# Patient Record
Sex: MALE | Race: WHITE | NOT HISPANIC OR LATINO | Employment: STUDENT | URBAN - METROPOLITAN AREA
[De-identification: names, ages, dates, MRNs, and addresses within clinical notes are randomized per-mention and may not be internally consistent; named-entity substitution may affect disease eponyms.]

---
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
## 2023-10-06 ENCOUNTER — HOSPITAL ENCOUNTER (EMERGENCY)
Facility: HOSPITAL | Age: 18
Discharge: HOME | End: 2023-10-06
Attending: STUDENT IN AN ORGANIZED HEALTH CARE EDUCATION/TRAINING PROGRAM
Payer: MEDICAID

## 2023-10-06 VITALS
BODY MASS INDEX: 21.26 KG/M2 | DIASTOLIC BLOOD PRESSURE: 68 MMHG | OXYGEN SATURATION: 98 % | SYSTOLIC BLOOD PRESSURE: 116 MMHG | HEART RATE: 78 BPM | TEMPERATURE: 97 F | RESPIRATION RATE: 16 BRPM | WEIGHT: 120 LBS | HEIGHT: 63 IN

## 2023-10-06 DIAGNOSIS — R10.13 EPIGASTRIC PAIN: Primary | ICD-10-CM

## 2023-10-06 LAB
ALBUMIN SERPL-MCNC: 5.3 G/DL (ref 3.3–5.5)
ALBUMIN SERPL-MCNC: 5.5 G/DL (ref 3.3–5.5)
ALP SERPL-CCNC: 69 U/L (ref 42–141)
ALP SERPL-CCNC: 70 U/L (ref 42–141)
BILIRUB SERPL-MCNC: 1.2 MG/DL (ref 0.2–1.6)
BILIRUB SERPL-MCNC: 1.2 MG/DL (ref 0.2–1.6)
BILIRUBIN, POC UA: NEGATIVE
BLOOD, POC UA: NEGATIVE
BUN SERPL-MCNC: 10 MG/DL (ref 7–22)
CALCIUM SERPL-MCNC: 10.5 MG/DL (ref 8–10.3)
CHLORIDE SERPL-SCNC: 104 MMOL/L (ref 98–108)
CLARITY, POC UA: CLEAR
COLOR, POC UA: YELLOW
CREAT SERPL-MCNC: 0.9 MG/DL (ref 0.6–1.2)
GLUCOSE SERPL-MCNC: 109 MG/DL (ref 73–118)
GLUCOSE, POC UA: NEGATIVE
KETONES, POC UA: ABNORMAL
LEUKOCYTE EST, POC UA: NEGATIVE
NITRITE, POC UA: NEGATIVE
PH UR STRIP: 7 [PH]
POC ALT (SGPT): 16 U/L (ref 10–47)
POC ALT (SGPT): 19 U/L (ref 10–47)
POC AMYLASE: 33 U/L (ref 14–97)
POC AST (SGOT): 24 U/L (ref 11–38)
POC AST (SGOT): 28 U/L (ref 11–38)
POC GGT: 12 U/L (ref 5–65)
POC TCO2: 26 MMOL/L (ref 18–33)
POTASSIUM BLD-SCNC: 3.8 MMOL/L (ref 3.6–5.1)
PROTEIN, POC UA: ABNORMAL
PROTEIN, POC: 7.6 G/DL (ref 6.4–8.1)
PROTEIN, POC: 7.7 G/DL (ref 6.4–8.1)
SODIUM BLD-SCNC: 140 MMOL/L (ref 128–145)
SPECIFIC GRAVITY, POC UA: >=1.03
UROBILINOGEN, POC UA: 0.2 E.U./DL

## 2023-10-06 PROCEDURE — 96374 THER/PROPH/DIAG INJ IV PUSH: CPT | Mod: ER

## 2023-10-06 PROCEDURE — 82040 ASSAY OF SERUM ALBUMIN: CPT | Mod: 59,ER

## 2023-10-06 PROCEDURE — 80053 COMPREHEN METABOLIC PANEL: CPT | Mod: ER

## 2023-10-06 PROCEDURE — 63600175 PHARM REV CODE 636 W HCPCS: Mod: ER

## 2023-10-06 PROCEDURE — 25000003 PHARM REV CODE 250: Mod: ER

## 2023-10-06 PROCEDURE — 99284 EMERGENCY DEPT VISIT MOD MDM: CPT | Mod: 25,ER

## 2023-10-06 PROCEDURE — 96375 TX/PRO/DX INJ NEW DRUG ADDON: CPT | Mod: ER

## 2023-10-06 PROCEDURE — 82150 ASSAY OF AMYLASE: CPT | Mod: ER

## 2023-10-06 PROCEDURE — 96361 HYDRATE IV INFUSION ADD-ON: CPT | Mod: ER

## 2023-10-06 RX ORDER — ONDANSETRON 4 MG/1
4 TABLET, ORALLY DISINTEGRATING ORAL EVERY 6 HOURS PRN
Qty: 14 TABLET | Refills: 0 | Status: SHIPPED | OUTPATIENT
Start: 2023-10-06

## 2023-10-06 RX ORDER — FAMOTIDINE 20 MG/1
20 TABLET, FILM COATED ORAL 2 TIMES DAILY
Qty: 20 TABLET | Refills: 0 | Status: SHIPPED | OUTPATIENT
Start: 2023-10-06 | End: 2023-10-16

## 2023-10-06 RX ORDER — ONDANSETRON 2 MG/ML
4 INJECTION INTRAMUSCULAR; INTRAVENOUS
Status: COMPLETED | OUTPATIENT
Start: 2023-10-06 | End: 2023-10-06

## 2023-10-06 RX ORDER — FAMOTIDINE 10 MG/ML
20 INJECTION INTRAVENOUS
Status: COMPLETED | OUTPATIENT
Start: 2023-10-06 | End: 2023-10-06

## 2023-10-06 RX ORDER — SUCRALFATE 1 G/1
1 TABLET ORAL 4 TIMES DAILY
Qty: 40 TABLET | Refills: 0 | Status: SHIPPED | OUTPATIENT
Start: 2023-10-06 | End: 2023-10-16

## 2023-10-06 RX ORDER — MAG HYDROX/ALUMINUM HYD/SIMETH 200-200-20
30 SUSPENSION, ORAL (FINAL DOSE FORM) ORAL
Status: COMPLETED | OUTPATIENT
Start: 2023-10-06 | End: 2023-10-06

## 2023-10-06 RX ADMIN — ALUMINUM HYDROXIDE, MAGNESIUM HYDROXIDE, AND DIMETHICONE 30 ML: 200; 20; 200 SUSPENSION ORAL at 04:10

## 2023-10-06 RX ADMIN — ONDANSETRON 4 MG: 2 INJECTION INTRAMUSCULAR; INTRAVENOUS at 04:10

## 2023-10-06 RX ADMIN — SODIUM CHLORIDE, POTASSIUM CHLORIDE, SODIUM LACTATE AND CALCIUM CHLORIDE 1000 ML: 600; 310; 30; 20 INJECTION, SOLUTION INTRAVENOUS at 04:10

## 2023-10-06 RX ADMIN — FAMOTIDINE 20 MG: 10 INJECTION INTRAVENOUS at 04:10

## 2023-10-06 NOTE — ED PROVIDER NOTES
Encounter Date: 10/6/2023    SCRIBE #1 NOTE: IDIMPLE am scribing for, and in the presence of,  Siddharth Moffett PA-C. I have scribed the following portions of the note - Other sections scribed: HPI, ROS, PE.       History     Chief Complaint   Patient presents with    Abdominal Pain     Left sided abd pain, onset this am, thinks he ate bad fish last night     Jakob Arboleda is a 18 y.o. male, with a PMHx of migraines and recurring constipation, who presents to the ED with epigastric pain x 1 day. Patient reports he may have eaten some spoiled salmon last night.  Reports nausea, but denies no episodes of emesis.  Reports 1 episode of BRPR but denies any other occurrences.  Denies headache, lightheadedness.  He denies any history/prior episodes of hematochezia. He denies any recent trauma to the abdomen. Pt endorses a history of frequent NSAID use for treating migraines.  Reports last bowel movement today, passing flatus.  Denies history of abdominal surgeries.  No other exacerbating or alleviating factors. Denies fever, chills, cough, congestion, rhinorrhea, dysuria, testicular pain, or other associated symptoms.    The history is provided by the patient. No  was used.     Review of patient's allergies indicates:   Allergen Reactions    Penicillins Swelling     No past medical history on file.  No past surgical history on file.  No family history on file.  Social History     Tobacco Use    Smoking status: Never     Review of Systems   Constitutional:  Negative for chills and fever.   HENT:  Negative for congestion, ear pain, rhinorrhea and sore throat.    Respiratory:  Negative for cough and shortness of breath.    Cardiovascular:  Negative for chest pain.   Gastrointestinal:  Positive for abdominal pain, blood in stool (blood when wiping) and nausea. Negative for diarrhea and vomiting.   Genitourinary:  Negative for dysuria, flank pain, frequency, hematuria and testicular pain.    Neurological:  Negative for light-headedness and headaches.       Physical Exam     Initial Vitals [10/06/23 1625]   BP Pulse Resp Temp SpO2   129/70 88 19 97.9 °F (36.6 °C) 99 %      MAP       --         Physical Exam    Nursing note and vitals reviewed.  Constitutional: Vital signs are normal. He appears well-developed. He is cooperative.  Non-toxic appearance. He does not appear ill.   HENT:   Head: Normocephalic and atraumatic.   Eyes: Conjunctivae are normal.   Neck:   Normal range of motion.  Cardiovascular:  Normal rate and regular rhythm.           Pulmonary/Chest: Effort normal and breath sounds normal. He exhibits no tenderness.   Abdominal: Abdomen is soft. There is no abdominal tenderness.   Musculoskeletal:      Cervical back: Normal range of motion.     Neurological: He is alert and oriented to person, place, and time. GCS eye subscore is 4. GCS verbal subscore is 5. GCS motor subscore is 6.   Skin: Skin is warm, dry and intact. No rash noted.   Psychiatric: He has a normal mood and affect. His speech is normal and behavior is normal. Judgment and thought content normal.         ED Course   Procedures  Labs Reviewed   POCT URINALYSIS W/O SCOPE - Abnormal; Notable for the following components:       Result Value    Ketones, UA Trace (*)     Spec Grav UA >=1.030 (*)     Protein, UA Trace (*)     All other components within normal limits   POCT CMP - Abnormal; Notable for the following components:    Calcium, POC 10.5 (*)     All other components within normal limits   POCT CBC   POCT URINALYSIS W/O SCOPE   POCT CMP   POCT LIVER PANEL   POCT LIVER PANEL          Imaging Results    None          Medications   lactated ringers bolus 1,000 mL (0 mLs Intravenous Stopped 10/6/23 1732)   aluminum-magnesium hydroxide-simethicone 200-200-20 mg/5 mL suspension 30 mL (30 mLs Oral Given 10/6/23 1657)   famotidine (PF) injection 20 mg (20 mg Intravenous Given 10/6/23 1657)   ondansetron injection 4 mg (4 mg  Intravenous Given 10/6/23 6864)     Medical Decision Making  This is an emergent evaluation of a 18-year-old male with a past medical history of migraines and constipation who presents to the emergency department for evaluation of epigastric pain x 1 day.  Physical exam reveals Regular rate rhythm without murmurs.  Lungs are clear to auscultation bilaterally.  Abdomen is soft, nontender, non distended, with normal bowel sounds.  Differential diagnosis includes but is not limited to GERD/gastritis, pancreatitis, cholecystitis, appendicitis, bowel obstruction.  Workup initiated with basic labs, amylase, UA.  Ordered GI cocktail, Pepcid, Zofran for symptoms.  I am not concerned for an acute abdomen at this time given benign abdominal exam.  CBC without leukocytosis or anemia.  CMP with normal renal function, no electrolyte abnormalities.  Amylase within normal limits.  UA showing no signs of infection, negative for nitrites or leukocytes.  Patient reports improvement in symptoms after GI cocktail, Pepcid, Zofran.  Serial abdominal exam remains benign.  Will treat for GERD/gastritis at this time.  Will send home with Pepcid, Carafate, Zofran.  Ambulatory referral placed to gastroenterology for follow-up. Patient is very well appearing, and in no acute distress. Vital signs are reassuring here in the emergency department, patient is afebrile, breathing comfortable, satting 98 % on room air. Patient is stable for discharge at this time. Patient verbalizes understanding of care plan. All questions and concerns were addressed. Discussed strict return precautions with the patient. Instructed follow up with primary care provider and GI within 1 week.      Siddharth Moffett PA-C    DISCLAIMER: This note was prepared with [employer] voice recognition transcription software. Garbled syntax, mangled pronouns, and other bizarre constructions may be attributed to that software system.     Amount and/or Complexity of Data Reviewed  Labs:  ordered.    Risk  OTC drugs.  Prescription drug management.            Scribe Attestation:   Scribe #1: I performed the above scribed service and the documentation accurately describes the services I performed. I attest to the accuracy of the note.                      I, Siddharth Moffett PA-C, personally performed the services described in this documentation.  All medical record entries made by the scribe were at my direction and in my presence.  I have reviewed the chart and agree that the record reflects my personal performance and is accurate and complete.  Clinical Impression:   Final diagnoses:  [R10.13] Epigastric pain (Primary)        ED Disposition Condition    Discharge Stable          ED Prescriptions       Medication Sig Dispense Start Date End Date Auth. Provider    sucralfate (CARAFATE) 1 gram tablet Take 1 tablet (1 g total) by mouth 4 (four) times daily. for 10 days 40 tablet 10/6/2023 10/16/2023 Siddharth Moffett PA-C    famotidine (PEPCID) 20 MG tablet Take 1 tablet (20 mg total) by mouth 2 (two) times daily. for 10 days 20 tablet 10/6/2023 10/16/2023 Siddharth Moffett PA-C    ondansetron (ZOFRAN-ODT) 4 MG TbDL Take 1 tablet (4 mg total) by mouth every 6 (six) hours as needed (For nausea and vomiting). 14 tablet 10/6/2023 -- Siddharth Moffett PA-C          Follow-up Information       Follow up With Specialties Details Why Contact Info    [facility] ED Emergency Medicine Go to  As needed, If symptoms worsen, or new symptoms develop [address] [facility] [address] [phone]             Siddharth Moffett PA-C  10/06/23 3393

## 2023-10-06 NOTE — Clinical Note
"Jakob"Leigh Arboleda was seen and treated in our emergency department on 10/6/2023.  He may return to work on 10/06/2023.       If you have any questions or concerns, please don't hesitate to call.      Siddharth Moffett PA-C"

## 2023-10-06 NOTE — DISCHARGE INSTRUCTIONS
You may take Carafate and Pepcid as prescribed for abdominal pain.  You may take Zofran as prescribed for nausea and vomiting.  I have placed a referral to Gastroenterology, expected call for arrangements.  Please also follow-up with your primary care provider within 1 week.  Thank you for coming to our Emergency Department today. It is important to remember that some problems are difficult to diagnose and may not be found during your Emergency Department visit. Be sure to follow up with your primary care doctor and review all labs/imaging/tests that were performed during this visit with them. Some labs/tests may be outside of the normal range and require non-emergent follow-up and further investigation to help diagnose/exclude/prevent complications or other medical conditions.    If you do not have a primary care doctor, you may contact the one listed on your discharge paperwork or you may also call the [facility] Appointment Desk at [phone] to schedule an appointment and establish care with one. It is important to your health that you have a primary care doctor.    Please take all medications as directed. All medications may potentially have side-effects and it is impossible to predict which medications may give you side-effects or what side-effects (if any) they will give you.. If you feel that you are having a negative effect or side-effect of any medication you should immediately stop taking them and seek medical attention. If you feel that you are having a life-threatening reaction call 361.    Return to the ER with any questions/concerns, new/concerning symptoms, worsening or failure to improve.     Do not drive, swim, climb to height, take a bath or make any important decisions for 24 hours if you have received any pain medications, sedatives or mood altering drugs during your ER visit.

## 2025-03-16 ENCOUNTER — HOSPITAL ENCOUNTER (EMERGENCY)
Facility: HOSPITAL | Age: 20
Discharge: HOME | End: 2025-03-16
Attending: INTERNAL MEDICINE

## 2025-03-16 VITALS
TEMPERATURE: 99 F | BODY MASS INDEX: 23.32 KG/M2 | RESPIRATION RATE: 16 BRPM | HEIGHT: 65 IN | OXYGEN SATURATION: 99 % | WEIGHT: 140 LBS | HEART RATE: 76 BPM | DIASTOLIC BLOOD PRESSURE: 69 MMHG | SYSTOLIC BLOOD PRESSURE: 123 MMHG

## 2025-03-16 DIAGNOSIS — R10.9 FLANK PAIN: ICD-10-CM

## 2025-03-16 DIAGNOSIS — N20.0 KIDNEY STONE: Primary | ICD-10-CM

## 2025-03-16 LAB
ALBUMIN SERPL-MCNC: 4.6 G/DL (ref 3.3–5.5)
ALLENS TEST: ABNORMAL
ALP SERPL-CCNC: 62 U/L (ref 42–141)
BILIRUB SERPL-MCNC: 0.8 MG/DL (ref 0.2–1.6)
BILIRUBIN, POC UA: NEGATIVE
BLOOD, POC UA: ABNORMAL
BUN SERPL-MCNC: 9 MG/DL (ref 7–22)
CALCIUM SERPL-MCNC: 10.4 MG/DL (ref 8–10.3)
CHLORIDE SERPL-SCNC: 107 MMOL/L (ref 98–108)
CLARITY, UA: CLEAR
COLOR, UA: YELLOW
CREAT SERPL-MCNC: 1.2 MG/DL (ref 0.6–1.2)
GLUCOSE SERPL-MCNC: 131 MG/DL (ref 73–118)
GLUCOSE, POC UA: NEGATIVE
HCO3 UR-SCNC: 26.4 MMOL/L (ref 24–28)
HCT, POC: NORMAL
HGB, POC: NORMAL (ref 14–18)
KETONES, POC UA: NEGATIVE
LDH SERPL L TO P-CCNC: 1.13 MMOL/L (ref 0.5–2.2)
LEUKOCYTE EST, POC UA: NEGATIVE
MCH, POC: NORMAL
MCHC, POC: NORMAL
MCV, POC: NORMAL
MPV, POC: NORMAL
NITRITE, POC UA: NEGATIVE
PCO2 BLDA: 47.9 MMHG (ref 35–45)
PH SMN: 7.35 [PH] (ref 7.35–7.45)
PH UR STRIP: 6 [PH] (ref 5–8)
PO2 BLDA: 45 MMHG (ref 40–60)
POC ALT (SGPT): 18 U/L (ref 10–47)
POC AST (SGOT): 31 U/L (ref 11–38)
POC BE: 0 MMOL/L
POC PLATELET COUNT: NORMAL
POC SATURATED O2: 78 % (ref 95–100)
POC TCO2: 28 MMOL/L (ref 18–33)
POC TCO2: 28 MMOL/L (ref 24–29)
POTASSIUM BLD-SCNC: 4.6 MMOL/L (ref 3.6–5.1)
PROTEIN, POC UA: ABNORMAL
PROTEIN, POC: 7.4 G/DL (ref 6.4–8.1)
RBC, POC: NORMAL
RDW, POC: NORMAL
SAMPLE: ABNORMAL
SITE: ABNORMAL
SODIUM BLD-SCNC: 142 MMOL/L (ref 128–145)
SPECIFIC GRAVITY, POC UA: 1.02 (ref 1–1.03)
UROBILINOGEN, POC UA: 0.2 E.U./DL
WBC, POC: NORMAL

## 2025-03-16 PROCEDURE — 96375 TX/PRO/DX INJ NEW DRUG ADDON: CPT | Mod: ER

## 2025-03-16 PROCEDURE — 63600175 PHARM REV CODE 636 W HCPCS: Mod: ER | Performed by: INTERNAL MEDICINE

## 2025-03-16 PROCEDURE — 99285 EMERGENCY DEPT VISIT HI MDM: CPT | Mod: 25,ER

## 2025-03-16 PROCEDURE — 25000003 PHARM REV CODE 250: Mod: ER | Performed by: EMERGENCY MEDICINE

## 2025-03-16 PROCEDURE — 96374 THER/PROPH/DIAG INJ IV PUSH: CPT | Mod: ER

## 2025-03-16 PROCEDURE — 82803 BLOOD GASES ANY COMBINATION: CPT | Mod: ER

## 2025-03-16 PROCEDURE — 25000003 PHARM REV CODE 250: Mod: ER | Performed by: INTERNAL MEDICINE

## 2025-03-16 PROCEDURE — 96361 HYDRATE IV INFUSION ADD-ON: CPT | Mod: ER

## 2025-03-16 PROCEDURE — 63600175 PHARM REV CODE 636 W HCPCS: Mod: ER | Performed by: EMERGENCY MEDICINE

## 2025-03-16 RX ORDER — OXYCODONE AND ACETAMINOPHEN 5; 325 MG/1; MG/1
1 TABLET ORAL EVERY 6 HOURS PRN
Qty: 8 TABLET | Refills: 0 | Status: SHIPPED | OUTPATIENT
Start: 2025-03-16

## 2025-03-16 RX ORDER — FAMOTIDINE 10 MG/ML
20 INJECTION, SOLUTION INTRAVENOUS
Status: COMPLETED | OUTPATIENT
Start: 2025-03-16 | End: 2025-03-16

## 2025-03-16 RX ORDER — MORPHINE SULFATE 4 MG/ML
4 INJECTION, SOLUTION INTRAMUSCULAR; INTRAVENOUS
Refills: 0 | Status: COMPLETED | OUTPATIENT
Start: 2025-03-16 | End: 2025-03-16

## 2025-03-16 RX ORDER — TAMSULOSIN HYDROCHLORIDE 0.4 MG/1
0.4 CAPSULE ORAL
Status: COMPLETED | OUTPATIENT
Start: 2025-03-16 | End: 2025-03-16

## 2025-03-16 RX ORDER — OXYCODONE AND ACETAMINOPHEN 5; 325 MG/1; MG/1
1 TABLET ORAL
Refills: 0 | Status: COMPLETED | OUTPATIENT
Start: 2025-03-16 | End: 2025-03-16

## 2025-03-16 RX ORDER — IBUPROFEN 600 MG/1
600 TABLET ORAL EVERY 6 HOURS PRN
Qty: 20 TABLET | Refills: 0 | Status: SHIPPED | OUTPATIENT
Start: 2025-03-16

## 2025-03-16 RX ORDER — ACETAMINOPHEN 500 MG
500 TABLET ORAL EVERY 6 HOURS PRN
Qty: 30 TABLET | Refills: 0 | Status: SHIPPED | OUTPATIENT
Start: 2025-03-16

## 2025-03-16 RX ORDER — ONDANSETRON 8 MG/1
8 TABLET, ORALLY DISINTEGRATING ORAL EVERY 6 HOURS PRN
Qty: 12 TABLET | Refills: 0 | Status: SHIPPED | OUTPATIENT
Start: 2025-03-16 | End: 2025-03-19

## 2025-03-16 RX ORDER — KETOROLAC TROMETHAMINE 30 MG/ML
15 INJECTION, SOLUTION INTRAMUSCULAR; INTRAVENOUS
Status: COMPLETED | OUTPATIENT
Start: 2025-03-16 | End: 2025-03-16

## 2025-03-16 RX ORDER — ONDANSETRON HYDROCHLORIDE 2 MG/ML
4 INJECTION, SOLUTION INTRAVENOUS
Status: COMPLETED | OUTPATIENT
Start: 2025-03-16 | End: 2025-03-16

## 2025-03-16 RX ORDER — TAMSULOSIN HYDROCHLORIDE 0.4 MG/1
0.4 CAPSULE ORAL DAILY
Qty: 10 CAPSULE | Refills: 0 | Status: SHIPPED | OUTPATIENT
Start: 2025-03-16 | End: 2026-03-16

## 2025-03-16 RX ADMIN — TAMSULOSIN HYDROCHLORIDE 0.4 MG: 0.4 CAPSULE ORAL at 08:03

## 2025-03-16 RX ADMIN — OXYCODONE HYDROCHLORIDE AND ACETAMINOPHEN 1 TABLET: 5; 325 TABLET ORAL at 08:03

## 2025-03-16 RX ADMIN — SODIUM CHLORIDE 1000 ML: 9 INJECTION, SOLUTION INTRAVENOUS at 06:03

## 2025-03-16 RX ADMIN — MORPHINE SULFATE 4 MG: 4 INJECTION, SOLUTION INTRAMUSCULAR; INTRAVENOUS at 06:03

## 2025-03-16 RX ADMIN — ONDANSETRON 4 MG: 2 INJECTION INTRAMUSCULAR; INTRAVENOUS at 06:03

## 2025-03-16 RX ADMIN — KETOROLAC TROMETHAMINE 15 MG: 30 INJECTION, SOLUTION INTRAMUSCULAR; INTRAVENOUS at 06:03

## 2025-03-16 RX ADMIN — FAMOTIDINE 20 MG: 10 INJECTION, SOLUTION INTRAVENOUS at 06:03

## 2025-03-16 NOTE — DISCHARGE INSTRUCTIONS
CT shows: 3 mm distal left ureteral stone with mild hydronephrosis/hydroureter.  Please sign up for and monitor all results on [facility] patient portal.    Please return to the ED for any new, concerning symptoms, or worsening symptoms.    Please follow-up with your primary care provider within 1 day.  An ER visit can not replace the evaluation by your primary care physician.    In the emergency department it is our goal to rule out life-threatening conditions and make sure that you are safe to be discharged home.    Many medical conditions are difficult to diagnose and may be impossible to diagnosed during a single ER visit.  Many health conditions start with nonspecific symptoms and can only be diagnosed on follow-up visits with your primary care physician or specialist.    Please be aware that all medical conditions can change and we can not predict how you will be feeling tomorrow or the next day.   If you have any worsening or new symptoms you should not hesitate to return to the emergency department for re-evaluation.  Be sure to follow up with your primary care physician for recheck and review any labs or imaging that were performed today.  It is very common for us to identify non emergent incidental findings on labs and imaging which must be followed up with your primary care physician.   If you do not have a primary care physician, you may contact the 1 listed on your discharge paperwork or you can also call the [facility] appointment desk at [phone] to schedule an appointment.

## 2025-03-16 NOTE — Clinical Note
"Jakob"Leigh Arboleda was seen and treated in our emergency department on 3/16/2025.  He may return to work on 03/18/2025.       If you have any questions or concerns, please don't hesitate to call.      Yadira Hu, DO"

## 2025-03-16 NOTE — ED PROVIDER NOTES
Encounter Date: 3/16/2025       History     Chief Complaint   Patient presents with    Flank Pain     Pt reports LLQ abdominal pain with nausea x 2100.     19-year-old male presents to emergency department complaining of left lower quadrant abdominal pain with nausea since 9:00 p.m..  Denies fever/chills/chest pain/shortness breath.  Endorses smoking marijuana 2 days ago.    The history is provided by the patient. No  was used.     Review of patient's allergies indicates:   Allergen Reactions    Penicillins Swelling     No past medical history on file.  No past surgical history on file.  No family history on file.  Social History[1]  Review of Systems   Constitutional:  Negative for chills and fever.   Respiratory:  Negative for shortness of breath.    Cardiovascular:  Negative for chest pain.   Gastrointestinal:  Positive for abdominal pain and nausea. Negative for abdominal distention, constipation, diarrhea and vomiting.   All other systems reviewed and are negative.      Physical Exam     Initial Vitals [03/16/25 0507]   BP Pulse Resp Temp SpO2   131/77 81 20 98.9 °F (37.2 °C) 99 %      MAP       --         Physical Exam    Nursing note and vitals reviewed.  Constitutional: He is not diaphoretic. No distress.   HENT:   Head: Normocephalic and atraumatic.   Right Ear: External ear normal.   Left Ear: External ear normal. Mouth/Throat: Oropharynx is clear and moist.   Eyes: Conjunctivae and EOM are normal.   Neck: Neck supple.   Normal range of motion.  Cardiovascular:  Normal rate and regular rhythm.           Pulmonary/Chest: Breath sounds normal. No respiratory distress.   Abdominal: Abdomen is soft. Bowel sounds are normal. He exhibits no distension. There is abdominal tenderness (Left lower quadrant tenderness to palpation without peritoneal signs).   Musculoskeletal:         General: Normal range of motion.      Cervical back: Normal range of motion and neck supple.     Neurological: He  is alert. He has normal strength.   Skin: Skin is warm and dry. Capillary refill takes less than 2 seconds.   Psychiatric: He has a normal mood and affect. Thought content normal.         ED Course   Critical Care    Date/Time: 3/16/2025 8:28 AM    Performed by: Yadira Hu DO  Authorized by: Kolton Baum MD  Direct patient critical care time: 8 minutes  Additional history critical care time: 8 minutes  Ordering / reviewing critical care time: 8 minutes  Documentation critical care time: 8 minutes  Total critical care time (exclusive of procedural time) : 32 minutes  Critical care was necessary to treat or prevent imminent or life-threatening deterioration of the following conditions: dehydration.  Critical care was time spent personally by me on the following activities: evaluation of patient's response to treatment, examination of patient, obtaining history from patient or surrogate, ordering and performing treatments and interventions, ordering and review of laboratory studies, ordering and review of radiographic studies, re-evaluation of patient's condition, pulse oximetry and review of old charts.  Subsequent provider of critical care: I assumed direction of critical care for this patient from another provider of my specialty.        Labs Reviewed   POCT URINALYSIS W/O SCOPE - Abnormal       Result Value    Glucose, UA Negative      Bilirubin, UA Negative      Ketones, UA Negative      Spec Grav UA 1.025      Blood, UA 3+ (*)     PH, UA 6.0      Protein, UA 1+ (*)     Urobilinogen, UA 0.2      Nitrite, UA Negative      Leukocytes, UA Negative      Color, UA POC Yellow      Clarity, UA, POC Clear     ISTAT PROCEDURE - Abnormal    POC PH 7.349 (*)     POC PCO2 47.9 (*)     POC PO2 45      POC HCO3 26.4      POC BE 0      POC SATURATED O2 78      POC Lactate 1.13      POC TCO2 28      Sample VENOUS      Site Other      Allens Test N/A     POCT CMP - Abnormal    Albumin, POC 4.6      Alkaline Phosphatase,  POC 62      ALT (SGPT), POC 18      AST (SGOT), POC 31      POC BUN 9      Calcium, POC 10.4 (*)     POC Chloride 107      POC Creatinine 1.2      POC Glucose 131 (*)     POC Potassium 4.6      POC Sodium 142      Bilirubin, POC 0.8      POC TCO2 28      Protein, POC 7.4     POCT CBC    Hematocrit        Hemoglobin        RBC        WBC        MCV        MCH, POC        MCHC        RDW-CV        Platelet Count, POC        MPV       POCT URINALYSIS W/O SCOPE   POCT CMP          Imaging Results              CT Renal Stone Study ABD Pelvis WO (Final result)  Result time 03/16/25 07:33:23      Final result by Cookie Gonsalez MD (03/16/25 07:33:23)                   Impression:      3 mm distal left ureteral stone with mild hydronephrosis/hydroureter..    Nonobstructive punctate right forming renal stones.      Electronically signed by: Cookie Gonsalez MD  Date:    03/16/2025  Time:    07:33               Narrative:    EXAMINATION:  CT RENAL STONE STUDY ABD PELVIS WO    CLINICAL HISTORY:  Flank pain, kidney stone suspected;hematuria with flank pain;Pain abdominal unsp. (789.00);    TECHNIQUE:  Low dose axial images, sagittal and coronal reformations were obtained from the lung bases to the pubic symphysis.  Contrast was not administered.    COMPARISON:  None    FINDINGS:  The lung bases are clear.  No pericardial effusion.    The noncontrast appearance of the liver appears normal.  The gallbladder is present, no radiopaque stones seen within.    The noncontrast appearance of the pancreas, spleen, bilateral adrenal glands appear normal.  Punctate foci of hyperattenuation within the right kidney suggestive of tiny forming stones.  Slight prominence of the left collecting system.  Punctate calcification at the left hemipelvis could represent a urolith, about 3 mm.    Mild stool retention, no bowel dilation.  No inflammatory changes of the bowel seen.  The appendix is not definitely identified.  Small focus of  high density material noted within the colon. Trace of free fluid in the pelvis, no free air.    The abdominal wall is intact.  The inguinal regions appear normal.    The osseous structures appear within normal limits.                                       Medications   sodium chloride 0.9% bolus 1,000 mL 1,000 mL (0 mLs Intravenous Stopped 3/16/25 0715)   ketorolac injection 15 mg (15 mg Intravenous Given 3/16/25 0609)   ondansetron injection 4 mg (4 mg Intravenous Given 3/16/25 0610)   famotidine (PF) injection 20 mg (20 mg Intravenous Given 3/16/25 0611)   morphine injection 4 mg (4 mg Intravenous Given 3/16/25 0653)   tamsulosin 24 hr capsule 0.4 mg (0.4 mg Oral Given 3/16/25 0810)   oxyCODONE-acetaminophen 5-325 mg per tablet 1 tablet (1 tablet Oral Given 3/16/25 0810)     Medical Decision Making  19-year-old male presents to emergency department complaining of left lower quadrant abdominal pain with nausea since 9:00 p.m..  Denies fever/chills/chest pain/shortness breath.  Endorses smoking marijuana 2 days ago.  Course of ED stay:   1. Cardiovascular-patient has been hemodynamically stable throughout ED stay and chest pain-free.    2. Pulmonary-patient has had normal O2 sats on room air and no signs or symptoms of respiratory distress.  3. Hematology/infectious disease-patient has been afebrile.  CBC was ordered.  Serum lactate was ordered.  4. GI/nutrition/renal/endocrine-CMP was ordered.  Patient received normal saline bolus/Pepcid/Zofran/Toradol.  Care of this patient was transferred to Dr. Hu at shift change pending lab results/reassessment/disposition.      Amount and/or Complexity of Data Reviewed  Labs: ordered.  Radiology: ordered.    Risk  OTC drugs.  Prescription drug management.               ED Course as of 03/16/25 0829   Sun Mar 16, 2025   0606 I assumed care of patient at 6:00 a.m..  Patient reports mild relief of pain.  Patient states the pain can continues to bother him.  Patient states he  asked to sit up or the pain is unbearable.  At this time we will give morphine for additional pain control. [RF]      ED Course User Index  [RF] Yadira Hu DO                           Clinical Impression:  Final diagnoses:  [N20.0] Kidney stone (Primary)  [R10.9] Flank pain          ED Disposition Condition    Discharge Stable          ED Prescriptions       Medication Sig Dispense Start Date End Date Auth. Provider    tamsulosin (FLOMAX) 0.4 mg Cap Take 1 capsule (0.4 mg total) by mouth once daily. 10 capsule 3/16/2025 3/16/2026 Yadira Hu DO    oxyCODONE-acetaminophen (PERCOCET) 5-325 mg per tablet Take 1 tablet by mouth every 6 (six) hours as needed for Pain (As needed for severe 10/10 pain). 8 tablet 3/16/2025 -- Yadira Hu DO    ondansetron (ZOFRAN-ODT) 8 MG TbDL Take 1 tablet (8 mg total) by mouth every 6 (six) hours as needed. 12 tablet 3/16/2025 3/19/2025 Yadira Hu DO    ibuprofen (ADVIL,MOTRIN) 600 MG tablet Take 1 tablet (600 mg total) by mouth every 6 (six) hours as needed for Pain (Take with food as needed for mild-to-moderate pain). 20 tablet 3/16/2025 -- Yadira Hu DO    acetaminophen (TYLENOL) 500 MG tablet Take 1 tablet (500 mg total) by mouth every 6 (six) hours as needed for Pain (and fever). 30 tablet 3/16/2025 -- Yadira Hu DO          Follow-up Information       Follow up With Specialties Details Why Contact Info    [facility] Urology Schedule an appointment as soon as possible for a visit in 1 day  [address] [facility] [place] [address] [phone]    [facility] - Emergency Dept Emergency Medicine Go to  Please go to [facility] emergency department if symptoms worsen [address] [facility] [place] [address] [phone]               [1]   Social History  Tobacco Use    Smoking status: Never        Yadira Hu DO  03/16/25 [id number]

## 2025-03-19 ENCOUNTER — TELEPHONE (OUTPATIENT)
Dept: UROLOGY | Facility: CLINIC | Age: 20
End: 2025-03-19

## 2025-03-19 NOTE — TELEPHONE ENCOUNTER
----- Message from [employer] sent at 3/19/2025 11:40 AM CDT -----  Regarding: Jakob  Type:  Sooner Appointment Request Patient is requesting a sooner appointment.  Patient declined first available appointment listed as well as another facility and provider .  Patient will not accept being placed on the waitlist and is requesting a message be sent to doctor. Name of Caller: Jakob When is the first available appointment? Symptoms: Kidney stone. Pt stated that he went to Urgent Care for kidney stones and he was suppose to get an appointment with Urology. Pt does have a referral but it is pending. Please reach out to the patient for scheduling. Would the patient rather a call back or a response via [facility]? Callback Best Call Back Number: [phone] Information:

## 2025-03-20 ENCOUNTER — OFFICE VISIT (OUTPATIENT)
Dept: UROLOGY | Facility: CLINIC | Age: 20
End: 2025-03-20

## 2025-03-20 DIAGNOSIS — R10.9 LEFT FLANK PAIN: ICD-10-CM

## 2025-03-20 DIAGNOSIS — N20.1 URETERAL STONE: Primary | ICD-10-CM

## 2025-03-20 PROCEDURE — 99213 OFFICE O/P EST LOW 20 MIN: CPT | Mod: PBBFAC | Performed by: UROLOGY

## 2025-03-20 PROCEDURE — 99999 PR PBB SHADOW E&M-EST. PATIENT-LVL III: CPT | Mod: PBBFAC,,, | Performed by: UROLOGY

## 2025-03-20 NOTE — PROGRESS NOTES
"Subjective:       Patient ID: Jakob Arboleda is a 19 y.o. male who was referred by Self, Aaareferral    Chief Complaint:   No chief complaint on file.      Urolithiasis  Patient complains of left abdominal pain with radiation to the abdomen. Onset of symptoms was abrupt starting a few days ago with stable course since that time. Patient describes the pain as aching and colicky,  intermittent  and rated as moderate. The patient has had nausea and vomiting and no diaphoresis. There has been no fever or chills. The patient is complaining of dysuria or frequency. Risk factors for urolithiasis: poor fluid intake.    He had hypospadias as a child.  He had a repair using foreskin.    ACTIVE MEDICAL ISSUES:  Problem List[1]    PAST MEDICAL HISTORY  History reviewed. No pertinent past medical history.    PAST SURGICAL HISTORY:  History reviewed. No pertinent surgical history.    SOCIAL HISTORY:  Social History[2]    FAMILY HISTORY:  No family history on file.    ALLERGIES AND MEDICATIONS: updated and reviewed.  Review of patient's allergies indicates:   Allergen Reactions    Penicillins Swelling     Current Outpatient Medications   Medication Sig    acetaminophen (TYLENOL) 500 MG tablet Take 1 tablet (500 mg total) by mouth every 6 (six) hours as needed for Pain (and fever).    elastic bandage 2 X 5 "-yard Bn Apply 1 application topically continuous. Applied in office    ibuprofen (ADVIL,MOTRIN) 600 MG tablet Take 1 tablet (600 mg total) by mouth every 6 (six) hours as needed for Pain (Take with food as needed for mild-to-moderate pain).    oxyCODONE-acetaminophen (PERCOCET) 5-325 mg per tablet Take 1 tablet by mouth every 6 (six) hours as needed for Pain (As needed for severe 10/10 pain).    tamsulosin (FLOMAX) 0.4 mg Cap Take 1 capsule (0.4 mg total) by mouth once daily.    famotidine (PEPCID) 20 MG tablet Take 1 tablet (20 mg total) by mouth 2 (two) times daily. for 10 days     No current facility-administered " medications for this visit.       Review of Systems   Constitutional:  Negative for chills and fever.   HENT:  Negative for congestion.    Respiratory:  Negative for chest tightness and shortness of breath.    Cardiovascular:  Negative for chest pain and palpitations.   Gastrointestinal:  Negative for abdominal pain, constipation, diarrhea, nausea and vomiting.   Genitourinary:  Negative for difficulty urinating, dysuria, flank pain, hematuria and urgency.   Musculoskeletal:  Negative for arthralgias.   Neurological:  Negative for dizziness.   Psychiatric/Behavioral:  Negative for confusion.        Objective:      There were no vitals filed for this visit.  Physical Exam  Vitals and nursing note reviewed.   Constitutional:       Appearance: He is well-developed.   HENT:      Head: Normocephalic.   Eyes:      Conjunctiva/sclera: Conjunctivae normal.   Neck:      Thyroid: No thyromegaly.      Trachea: No tracheal deviation.   Cardiovascular:      Rate and Rhythm: Normal rate.      Heart sounds: Normal heart sounds.   Pulmonary:      Effort: Pulmonary effort is normal. No respiratory distress.      Breath sounds: Normal breath sounds. No wheezing.   Abdominal:      General: Bowel sounds are normal.      Palpations: Abdomen is soft.      Tenderness: There is no abdominal tenderness. There is no rebound.      Hernia: No hernia is present.   Musculoskeletal:         General: No tenderness. Normal range of motion.      Cervical back: Normal range of motion and neck supple.   Lymphadenopathy:      Cervical: No cervical adenopathy.   Skin:     General: Skin is warm and dry.      Findings: No erythema or rash.   Neurological:      Mental Status: He is alert and oriented to person, place, and time.   Psychiatric:         Behavior: Behavior normal.         Thought Content: Thought content normal.         Judgment: Judgment normal.         Urine dipstick shows negative for all components.  Micro exam: negative for WBC's or  RBC's.         Component  Ref Range & Units (hover) 4 d ago 1 yr ago   Glucose, UA Negative Negative R   Bilirubin, UA Negative Negative R   Ketones, UA Negative Trace Abnormal  R   Spec Grav UA 1.025 >=1.030 High Off-Scale (>) R   Blood, UA 3+ Abnormal  Negative R   PH, UA 6.0 7.0 R   Protein, UA 1+ Abnormal  Trace Abnormal  R   Urobilinogen, UA 0.2 0.2 R   Nitrite, UA Negative Negative R   Leukocytes, UA Negative Negative R   Color, UA POC Yellow    Clarity, UA, POC Clear    Resulting Agency [facility]             Specimen Collected: 03/16/25 05:44 CDT       CT Renal Stone Study ABD Pelvis WO  Order: [id number]   Status: Final result       Next appt: Today at 09:00 AM in Urology (Don Elkins MD)    Test Result Released: No (inaccessible in [employer])    0 Result Notes  Details    Reading Physician Reading Date Result Priority   Cookie Gonsalez MD  [phone]  3/16/2025 STAT     Narrative & Impression  EXAMINATION:  CT RENAL STONE STUDY ABD PELVIS WO     CLINICAL HISTORY:  Flank pain, kidney stone suspected;hematuria with flank pain;Pain abdominal unsp. (789.00);     TECHNIQUE:  Low dose axial images, sagittal and coronal reformations were obtained from the lung bases to the pubic symphysis.  Contrast was not administered.     COMPARISON:  None     FINDINGS:  The lung bases are clear.  No pericardial effusion.     The noncontrast appearance of the liver appears normal.  The gallbladder is present, no radiopaque stones seen within.     The noncontrast appearance of the pancreas, spleen, bilateral adrenal glands appear normal.  Punctate foci of hyperattenuation within the right kidney suggestive of tiny forming stones.  Slight prominence of the left collecting system.  Punctate calcification at the left hemipelvis could represent a urolith, about 3 mm.     Mild stool retention, no bowel dilation.  No inflammatory changes of the bowel seen.  The appendix is not definitely identified.  Small focus of  high density material noted within the colon. Trace of free fluid in the pelvis, no free air.     The abdominal wall is intact.  The inguinal regions appear normal.     The osseous structures appear within normal limits.     Impression:     3 mm distal left ureteral stone with mild hydronephrosis/hydroureter..     Nonobstructive punctate right forming renal stones.        Electronically signed by:Cookie Gonsalez MD  Date:                                            03/16/2025  Time:                                           07:33        Exam Ended: 03/16/25 06:23 CDT Last Resulted: 03/16/25 07:33 CDT           Assessment:       1. Ureteral stone    2. Left flank pain          Plan:       1. Ureteral stone    - US Retroperitoneal Complete; Future    2. Left flank pain    - US Retroperitoneal Complete; Future            Follow up in about 1 week (around 3/27/2025) for Follow up Established, Review X-ray.         [1] There is no problem list on file for this patient.   [2]   Social History  Tobacco Use    Smoking status: Never

## 2025-03-28 ENCOUNTER — OFFICE VISIT (OUTPATIENT)
Dept: UROLOGY | Facility: CLINIC | Age: 20
End: 2025-03-28
Payer: MEDICAID

## 2025-03-28 VITALS — BODY MASS INDEX: 18.4 KG/M2 | WEIGHT: 110.56 LBS

## 2025-03-28 DIAGNOSIS — N20.1 URETERAL STONE: ICD-10-CM

## 2025-03-28 DIAGNOSIS — N20.1 URETERAL STONE: Primary | ICD-10-CM

## 2025-03-28 DIAGNOSIS — R10.9 LEFT FLANK PAIN: ICD-10-CM

## 2025-03-28 PROCEDURE — 99213 OFFICE O/P EST LOW 20 MIN: CPT | Mod: PBBFAC | Performed by: UROLOGY

## 2025-03-28 PROCEDURE — 99999 PR PBB SHADOW E&M-EST. PATIENT-LVL III: CPT | Mod: PBBFAC,,, | Performed by: UROLOGY

## 2025-03-28 RX ORDER — TAMSULOSIN HYDROCHLORIDE 0.4 MG/1
0.4 CAPSULE ORAL DAILY
Qty: 30 CAPSULE | Refills: 0 | Status: SHIPPED | OUTPATIENT
Start: 2025-03-28 | End: 2025-04-27

## 2025-03-28 NOTE — PROGRESS NOTES
"Subjective:       Patient ID: Jakob Arboleda is a 19 y.o. male The patient's last visit with me was on 3/20/2025.     Chief Complaint:   Chief Complaint   Patient presents with    Follow-up     Kidney stone       Urolithiasis  Patient complains of left abdominal pain with radiation to the abdomen. Onset of symptoms was abrupt starting a few days ago with stable course since that time. Patient describes the pain as aching and colicky,  intermittent  and rated as moderate. The patient has had nausea and vomiting and no diaphoresis. There has been no fever or chills. The patient is complaining of dysuria or frequency. Risk factors for urolithiasis: poor fluid intake.    He had hypospadias as a child.  He had a repair using foreskin.    03/28/2025  He would like to have more time to pass the stone.  He has run out of Flomax.   He continues to have some left sided pain and has note some right sided pain.  No fever.    ACTIVE MEDICAL ISSUES:  Problem List[1]    PAST MEDICAL HISTORY  No past medical history on file.    PAST SURGICAL HISTORY:  No past surgical history on file.    SOCIAL HISTORY:  Social History[2]    FAMILY HISTORY:  No family history on file.    ALLERGIES AND MEDICATIONS: updated and reviewed.  Review of patient's allergies indicates:   Allergen Reactions    Penicillins Swelling     Current Outpatient Medications   Medication Sig    acetaminophen (TYLENOL) 500 MG tablet Take 1 tablet (500 mg total) by mouth every 6 (six) hours as needed for Pain (and fever).    elastic bandage 2 X 5 "-yard [facility] Apply 1 application topically continuous. Applied in office    famotidine (PEPCID) 20 MG tablet Take 1 tablet (20 mg total) by mouth 2 (two) times daily. for 10 days    ibuprofen (ADVIL,MOTRIN) 600 MG tablet Take 1 tablet (600 mg total) by mouth every 6 (six) hours as needed for Pain (Take with food as needed for mild-to-moderate pain).    oxyCODONE-acetaminophen (PERCOCET) 5-325 mg per tablet Take 1 tablet by mouth " every 6 (six) hours as needed for Pain (As needed for severe 10/10 pain).    tamsulosin (FLOMAX) 0.4 mg Cap Take 1 capsule (0.4 mg total) by mouth once daily.     No current facility-administered medications for this visit.       Review of Systems   Constitutional:  Negative for chills and fever.   HENT:  Negative for congestion.    Respiratory:  Negative for chest tightness and shortness of breath.    Cardiovascular:  Negative for chest pain and palpitations.   Gastrointestinal:  Negative for abdominal pain, constipation, diarrhea, nausea and vomiting.   Genitourinary:  Negative for difficulty urinating, dysuria, flank pain, hematuria and urgency.   Musculoskeletal:  Negative for arthralgias.   Neurological:  Negative for dizziness.   Psychiatric/Behavioral:  Negative for confusion.        Objective:      Vitals:    03/28/25 1344   Weight: 50.1 kg (110 lb 9 oz)     Physical Exam  Vitals and nursing note reviewed.   Constitutional:       Appearance: He is well-developed.   HENT:      Head: Normocephalic.   Eyes:      Conjunctiva/sclera: Conjunctivae normal.   Neck:      Thyroid: No thyromegaly.      Trachea: No tracheal deviation.   Cardiovascular:      Rate and Rhythm: Normal rate.      Heart sounds: Normal heart sounds.   Pulmonary:      Effort: Pulmonary effort is normal. No respiratory distress.      Breath sounds: Normal breath sounds. No wheezing.   Abdominal:      General: Bowel sounds are normal.      Palpations: Abdomen is soft.      Tenderness: There is no abdominal tenderness. There is no rebound.      Hernia: No hernia is present.   Musculoskeletal:         General: No tenderness. Normal range of motion.      Cervical back: Normal range of motion and neck supple.   Lymphadenopathy:      Cervical: No cervical adenopathy.   Skin:     General: Skin is warm and dry.      Findings: No erythema or rash.   Neurological:      Mental Status: He is alert and oriented to person, place, and time.   Psychiatric:          Behavior: Behavior normal.         Thought Content: Thought content normal.         Judgment: Judgment normal.         Urine dipstick shows negative for all components.  Micro exam: negative for WBC's or RBC's.         Component  Ref Range & Units (hover) 4 d ago 1 yr ago   Glucose, UA Negative Negative R   Bilirubin, UA Negative Negative R   Ketones, UA Negative Trace Abnormal  R   Spec Grav UA 1.025 >=1.030 High Off-Scale (>) R   Blood, UA 3+ Abnormal  Negative R   PH, UA 6.0 7.0 R   Protein, UA 1+ Abnormal  Trace Abnormal  R   Urobilinogen, UA 0.2 0.2 R   Nitrite, UA Negative Negative R   Leukocytes, UA Negative Negative R   Color, UA POC Yellow    Clarity, UA, POC Clear    Resulting Agency [facility]             Specimen Collected: 03/16/25 05:44 CDT       CT Renal Stone Study ABD Pelvis WO  Order: [id number]   Status: Final result       Next appt: Today at 09:00 AM in Urology (Don Elkins MD)    Test Result Released: No (inaccessible in My-Appsh[facility])    0 Result Notes  Details    Reading Physician Reading Date Result Priority   Cookie Gonsalez MD  [phone]  3/16/2025 STAT     Narrative & Impression  EXAMINATION:  CT RENAL STONE STUDY ABD PELVIS WO     CLINICAL HISTORY:  Flank pain, kidney stone suspected;hematuria with flank pain;Pain abdominal unsp. (789.00);     TECHNIQUE:  Low dose axial images, sagittal and coronal reformations were obtained from the lung bases to the pubic symphysis.  Contrast was not administered.     COMPARISON:  None     FINDINGS:  The lung bases are clear.  No pericardial effusion.     The noncontrast appearance of the liver appears normal.  The gallbladder is present, no radiopaque stones seen within.     The noncontrast appearance of the pancreas, spleen, bilateral adrenal glands appear normal.  Punctate foci of hyperattenuation within the right kidney suggestive of tiny forming stones.  Slight prominence of the left collecting system.  Punctate calcification at  the left hemipelvis could represent a urolith, about 3 mm.     Mild stool retention, no bowel dilation.  No inflammatory changes of the bowel seen.  The appendix is not definitely identified.  Small focus of high density material noted within the colon. Trace of free fluid in the pelvis, no free air.     The abdominal wall is intact.  The inguinal regions appear normal.     The osseous structures appear within normal limits.     Impression:     3 mm distal left ureteral stone with mild hydronephrosis/hydroureter..     Nonobstructive punctate right forming renal stones.        Electronically signed by:Cookie Gonsalez MD  Date:                                            03/16/2025  Time:                                           07:33        Exam Ended: 03/16/25 06:23 CDT Last Resulted: 03/16/25 07:33 CDT           Assessment:       1. Ureteral stone    2. Left flank pain            Plan:       1. Ureteral stone (Primary)    - tamsulosin (FLOMAX) 0.4 mg Cap; Take 1 capsule (0.4 mg total) by mouth once daily.  Dispense: 30 capsule; Refill: 0  - US Retroperitoneal Complete; Future    2. Left flank pain    - US Retroperitoneal Complete; Future             Follow up in about 1 week (around 4/4/2025) for Follow up Established, Review X-ray.           [1] There is no problem list on file for this patient.   [2]   Social History  Tobacco Use    Smoking status: Never

## 2025-03-31 ENCOUNTER — HOSPITAL ENCOUNTER (OUTPATIENT)
Dept: RADIOLOGY | Facility: HOSPITAL | Age: 20
Discharge: HOME | End: 2025-03-31
Attending: UROLOGY
Payer: MEDICAID

## 2025-03-31 DIAGNOSIS — R10.9 LEFT FLANK PAIN: ICD-10-CM

## 2025-03-31 DIAGNOSIS — N20.1 URETERAL STONE: ICD-10-CM

## 2025-03-31 PROCEDURE — 76770 US EXAM ABDO BACK WALL COMP: CPT | Mod: TC

## 2025-03-31 PROCEDURE — 76770 US EXAM ABDO BACK WALL COMP: CPT | Mod: 26,,, | Performed by: RADIOLOGY

## 2025-03-31 RX ORDER — TAMSULOSIN HYDROCHLORIDE 0.4 MG/1
CAPSULE ORAL
Qty: 90 CAPSULE | Refills: 3 | Status: SHIPPED | OUTPATIENT
Start: 2025-03-31

## 2025-04-04 ENCOUNTER — HOSPITAL ENCOUNTER (OUTPATIENT)
Dept: PREADMISSION TESTING | Facility: HOSPITAL | Age: 20
Discharge: HOME | End: 2025-04-04
Attending: UROLOGY
Payer: MEDICAID

## 2025-04-04 ENCOUNTER — ANESTHESIA EVENT (OUTPATIENT)
Dept: SURGERY | Facility: HOSPITAL | Age: 20
End: 2025-04-04
Payer: MEDICAID

## 2025-04-04 ENCOUNTER — TELEPHONE (OUTPATIENT)
Dept: SURGERY | Facility: HOSPITAL | Age: 20
End: 2025-04-04
Payer: MEDICAID

## 2025-04-04 ENCOUNTER — OFFICE VISIT (OUTPATIENT)
Dept: UROLOGY | Facility: CLINIC | Age: 20
End: 2025-04-04
Payer: MEDICAID

## 2025-04-04 VITALS
TEMPERATURE: 98 F | HEART RATE: 64 BPM | RESPIRATION RATE: 18 BRPM | WEIGHT: 108.94 LBS | SYSTOLIC BLOOD PRESSURE: 117 MMHG | OXYGEN SATURATION: 99 % | HEIGHT: 64 IN | BODY MASS INDEX: 18.6 KG/M2 | DIASTOLIC BLOOD PRESSURE: 72 MMHG

## 2025-04-04 VITALS — BODY MASS INDEX: 18.34 KG/M2 | WEIGHT: 110.25 LBS

## 2025-04-04 DIAGNOSIS — R10.9 LEFT FLANK PAIN: ICD-10-CM

## 2025-04-04 DIAGNOSIS — N20.1 URETERAL STONE: Primary | ICD-10-CM

## 2025-04-04 PROCEDURE — 99213 OFFICE O/P EST LOW 20 MIN: CPT | Mod: PBBFAC | Performed by: UROLOGY

## 2025-04-04 PROCEDURE — 99999 PR PBB SHADOW E&M-EST. PATIENT-LVL III: CPT | Mod: PBBFAC,,, | Performed by: UROLOGY

## 2025-04-04 RX ORDER — CEFAZOLIN SODIUM 2 G/50ML
2 SOLUTION INTRAVENOUS
OUTPATIENT
Start: 2025-04-04

## 2025-04-04 NOTE — PROGRESS NOTES
"Subjective:       Patient ID: Jakob Arboleda is a 19 y.o. male The patient's last visit with me was on 3/28/2025.     Chief Complaint:   Chief Complaint   Patient presents with    Follow-up     1w US result       Urolithiasis  Patient complains of left abdominal pain with radiation to the abdomen. Onset of symptoms was abrupt starting a few days ago with stable course since that time. Patient describes the pain as aching and colicky,  intermittent  and rated as moderate. The patient has had nausea and vomiting and no diaphoresis. There has been no fever or chills. The patient is complaining of dysuria or frequency. Risk factors for urolithiasis: poor fluid intake.    He had hypospadias as a child.  He had a repair using foreskin.    3/28/2025  He would like to have more time to pass the stone.  He has run out of Flomax.   He continues to have some left sided pain and has note some right sided pain.  No fever.    04/04/2025  He continues to have left sided pain and pain with urination.  He denies hematuria or fever.      ACTIVE MEDICAL ISSUES:  Problem List[1]    PAST MEDICAL HISTORY  History reviewed. No pertinent past medical history.    PAST SURGICAL HISTORY:  History reviewed. No pertinent surgical history.    SOCIAL HISTORY:  Social History[2]    FAMILY HISTORY:  No family history on file.    ALLERGIES AND MEDICATIONS: updated and reviewed.  Review of patient's allergies indicates:   Allergen Reactions    Penicillins Swelling     Current Outpatient Medications   Medication Sig    acetaminophen (TYLENOL) 500 MG tablet Take 1 tablet (500 mg total) by mouth every 6 (six) hours as needed for Pain (and fever).    elastic bandage 2 X 5 "-yard [facility] Apply 1 application topically continuous. Applied in office    famotidine (PEPCID) 20 MG tablet Take 1 tablet (20 mg total) by mouth 2 (two) times daily. for 10 days    ibuprofen (ADVIL,MOTRIN) 600 MG tablet Take 1 tablet (600 mg total) by mouth every 6 (six) hours as needed " for Pain (Take with food as needed for mild-to-moderate pain).    oxyCODONE-acetaminophen (PERCOCET) 5-325 mg per tablet Take 1 tablet by mouth every 6 (six) hours as needed for Pain (As needed for severe 10/10 pain).    tamsulosin (FLOMAX) 0.4 mg Cap TAKE 1 CAPSULE(0.4 MG) BY MOUTH DAILY     No current facility-administered medications for this visit.       Review of Systems   Constitutional:  Negative for chills and fever.   HENT:  Negative for congestion.    Respiratory:  Negative for chest tightness and shortness of breath.    Cardiovascular:  Negative for chest pain and palpitations.   Gastrointestinal:  Negative for abdominal pain, constipation, diarrhea, nausea and vomiting.   Genitourinary:  Negative for difficulty urinating, dysuria, flank pain, hematuria and urgency.   Musculoskeletal:  Negative for arthralgias.   Neurological:  Negative for dizziness.   Psychiatric/Behavioral:  Negative for confusion.        Objective:      Vitals:    04/04/25 1321   Weight: 50 kg (110 lb 3.7 oz)       Physical Exam  Vitals and nursing note reviewed.   Constitutional:       Appearance: He is well-developed.   HENT:      Head: Normocephalic.   Eyes:      Conjunctiva/sclera: Conjunctivae normal.   Neck:      Thyroid: No thyromegaly.      Trachea: No tracheal deviation.   Cardiovascular:      Rate and Rhythm: Normal rate.      Heart sounds: Normal heart sounds.   Pulmonary:      Effort: Pulmonary effort is normal. No respiratory distress.      Breath sounds: Normal breath sounds. No wheezing.   Abdominal:      General: Bowel sounds are normal.      Palpations: Abdomen is soft.      Tenderness: There is no abdominal tenderness. There is no rebound.      Hernia: No hernia is present.   Musculoskeletal:         General: No tenderness. Normal range of motion.      Cervical back: Normal range of motion and neck supple.   Lymphadenopathy:      Cervical: No cervical adenopathy.   Skin:     General: Skin is warm and dry.       Findings: No erythema or rash.   Neurological:      Mental Status: He is alert and oriented to person, place, and time.   Psychiatric:         Behavior: Behavior normal.         Thought Content: Thought content normal.         Judgment: Judgment normal.         Urine dipstick shows negative for all components.  Micro exam: negative for WBC's or RBC's.    US Retroperitoneal Complete  Order: [id number]   Status: Final result       Next appt: Today at 01:30 PM in Urology (Don Elkins MD)       Dx: Ureteral stone; Left flank pain    Test Result Released: Yes (not seen)    0 Result Notes  Details    Reading Physician Reading Date Result Priority   Abran Jacobo MD  [phone]  4/1/2025 Routine     Narrative & Impression  EXAMINATION:  US RETROPERITONEAL COMPLETE     CLINICAL HISTORY:  Calculus of ureter     TECHNIQUE:  Ultrasound of the kidneys and urinary bladder was performed including color flow and Doppler evaluation of the kidneys.     COMPARISON:  CT renal stone study dated 03/16/2025     FINDINGS:  Right kidney: Measures 10.2 cm.  Resistive index 0.53.  Corticomedullary distinction appears maintained.  Echogenic foci at 3 mm and 2 mm respectively suggesting nonobstructing stones.  No hydronephrosis.     Left kidney: Measures 10.0 cm.  Resistive index of 0.51.  Corticomedullary distinction appears maintained.  Echogenic 4 mm focus suggesting nonobstructing stone.  No hydronephrosis.     Urinary bladder is incompletely fluid-filled at the time of scanning which limits assessment, otherwise unremarkable.     Impression:     No hydronephrosis.     Bilateral renal echogenic foci suggesting nonobstructing stones.        Electronically signed by:Abran Jacobo  Date:                                            04/01/2025  Time:                                           08:44        Exam Ended: 03/31/25 18:23 CDT       CT Renal Stone Study ABD Pelvis WO  Order: [id number]   Status: Final result        Next appt: Today at 09:00 AM in Urology (Don Elkins MD)    Test Result Released: No (inaccessible in MyChart)    0 Result Notes  Details    Reading Physician Reading Date Result Priority   Cookie Gonsalez MD  [phone]  3/16/2025 STAT     Narrative & Impression  EXAMINATION:  CT RENAL STONE STUDY ABD PELVIS WO     CLINICAL HISTORY:  Flank pain, kidney stone suspected;hematuria with flank pain;Pain abdominal unsp. (789.00);     TECHNIQUE:  Low dose axial images, sagittal and coronal reformations were obtained from the lung bases to the pubic symphysis.  Contrast was not administered.     COMPARISON:  None     FINDINGS:  The lung bases are clear.  No pericardial effusion.     The noncontrast appearance of the liver appears normal.  The gallbladder is present, no radiopaque stones seen within.     The noncontrast appearance of the pancreas, spleen, bilateral adrenal glands appear normal.  Punctate foci of hyperattenuation within the right kidney suggestive of tiny forming stones.  Slight prominence of the left collecting system.  Punctate calcification at the left hemipelvis could represent a urolith, about 3 mm.     Mild stool retention, no bowel dilation.  No inflammatory changes of the bowel seen.  The appendix is not definitely identified.  Small focus of high density material noted within the colon. Trace of free fluid in the pelvis, no free air.     The abdominal wall is intact.  The inguinal regions appear normal.     The osseous structures appear within normal limits.     Impression:     3 mm distal left ureteral stone with mild hydronephrosis/hydroureter..     Nonobstructive punctate right forming renal stones.        Electronically signed by:Cookie Gonsalez MD  Date:                                            03/16/2025  Time:                                           07:33        Exam Ended: 03/16/25 06:23 CDT Last Resulted: 03/16/25 07:33 CDT           Assessment:       1.  Ureteral stone    2. Left flank pain              Plan:       1. Ureteral stone (Primary)  Cystoscopy with left ureteroscopy, laser lithotripsy, stent placement on Monday 4/7/2025    2. Left flank pain  As above             Follow up in about 4 weeks (around 5/2/2025) for Follow up Established.             [1] There is no problem list on file for this patient.   [2]   Social History  Tobacco Use    Smoking status: Never

## 2025-04-04 NOTE — H&P
"Subjective:       Patient ID: Jakob Arboleda is a 19 y.o. male The patient's last visit with me was on 3/28/2025.     Chief Complaint:   Chief Complaint   Patient presents with    Follow-up     1w US result       Urolithiasis  Patient complains of left abdominal pain with radiation to the abdomen. Onset of symptoms was abrupt starting a few days ago with stable course since that time. Patient describes the pain as aching and colicky, intermittent and rated as moderate. The patient has had nausea and vomiting and no diaphoresis. There has been no fever or chills. The patient is complaining of dysuria or frequency. Risk factors for urolithiasis: poor fluid intake.    He had hypospadias as a child.  He had a repair using foreskin.    3/28/2025  He would like to have more time to pass the stone.  He has run out of Flomax.   He continues to have some left sided pain and has note some right sided pain.  No fever.    04/04/2025  He continues to have left sided pain and pain with urination.  He denies hematuria or fever.      ACTIVE MEDICAL ISSUES:  [Problem List]    [Problem List]  Patient Active Problem List  There is no problem list on file for this patient.     PAST MEDICAL HISTORY  History reviewed. No pertinent past medical history.    PAST SURGICAL HISTORY:  History reviewed. No pertinent surgical history.    SOCIAL HISTORY:  [Social History]    [Social History]  Tobacco Use    Smoking status: Never       FAMILY HISTORY:  No family history on file.    ALLERGIES AND MEDICATIONS: updated and reviewed.  Review of patient's allergies indicates:   Allergen Reactions    Penicillins Swelling     Current Outpatient Medications   Medication Sig    acetaminophen (TYLENOL) 500 MG tablet Take 1 tablet (500 mg total) by mouth every 6 (six) hours as needed for Pain (and fever).    elastic bandage 2 X 5 "-yard Bn Apply 1 application topically continuous. Applied in office    famotidine (PEPCID) 20 MG tablet Take 1 tablet (20 mg " total) by mouth 2 (two) times daily. for 10 days    ibuprofen (ADVIL,MOTRIN) 600 MG tablet Take 1 tablet (600 mg total) by mouth every 6 (six) hours as needed for Pain (Take with food as needed for mild-to-moderate pain).    oxyCODONE-acetaminophen (PERCOCET) 5-325 mg per tablet Take 1 tablet by mouth every 6 (six) hours as needed for Pain (As needed for severe 10/10 pain).    tamsulosin (FLOMAX) 0.4 mg Cap TAKE 1 CAPSULE(0.4 MG) BY MOUTH DAILY     No current facility-administered medications for this visit.       Review of Systems   Constitutional:  Negative for chills and fever.   HENT:  Negative for congestion.    Respiratory:  Negative for chest tightness and shortness of breath.    Cardiovascular:  Negative for chest pain and palpitations.   Gastrointestinal:  Negative for abdominal pain, constipation, diarrhea, nausea and vomiting.   Genitourinary:  Negative for difficulty urinating, dysuria, flank pain, hematuria and urgency.   Musculoskeletal:  Negative for arthralgias.   Neurological:  Negative for dizziness.   Psychiatric/Behavioral:  Negative for confusion.        Objective:      Vitals:    04/04/25 1321   Weight: 50 kg (110 lb 3.7 oz)       Physical Exam  Vitals and nursing note reviewed.   Constitutional:       Appearance: He is well-developed.   HENT:      Head: Normocephalic.   Eyes:      Conjunctiva/sclera: Conjunctivae normal.   Neck:      Thyroid: No thyromegaly.      Trachea: No tracheal deviation.   Cardiovascular:      Rate and Rhythm: Normal rate.      Heart sounds: Normal heart sounds.   Pulmonary:      Effort: Pulmonary effort is normal. No respiratory distress.      Breath sounds: Normal breath sounds. No wheezing.   Abdominal:      General: Bowel sounds are normal.      Palpations: Abdomen is soft.      Tenderness: There is no abdominal tenderness. There is no rebound.      Hernia: No hernia is present.   Musculoskeletal:         General: No tenderness. Normal range of motion.      Cervical  back: Normal range of motion and neck supple.   Lymphadenopathy:      Cervical: No cervical adenopathy.   Skin:     General: Skin is warm and dry.      Findings: No erythema or rash.   Neurological:      Mental Status: He is alert and oriented to person, place, and time.   Psychiatric:         Behavior: Behavior normal.         Thought Content: Thought content normal.         Judgment: Judgment normal.         Urine dipstick shows negative for all components.  Micro exam: negative for WBC's or RBC's.    US Retroperitoneal Complete  Order: [id number]   Status: Final result       Next appt: Today at 01:30 PM in Urology (Don Elkins MD)       Dx: Ureteral stone; Left flank pain    Test Result Released: Yes (not seen)    0 Result Notes  Details    Reading Physician Reading Date Result Priority   Abran Jacobo MD  [phone]  4/1/2025 Routine     Narrative & Impression  EXAMINATION:  US RETROPERITONEAL COMPLETE     CLINICAL HISTORY:  Calculus of ureter     TECHNIQUE:  Ultrasound of the kidneys and urinary bladder was performed including color flow and Doppler evaluation of the kidneys.     COMPARISON:  CT renal stone study dated 03/16/2025     FINDINGS:  Right kidney: Measures 10.2 cm.  Resistive index 0.53.  Corticomedullary distinction appears maintained.  Echogenic foci at 3 mm and 2 mm respectively suggesting nonobstructing stones.  No hydronephrosis.     Left kidney: Measures 10.0 cm.  Resistive index of 0.51.  Corticomedullary distinction appears maintained.  Echogenic 4 mm focus suggesting nonobstructing stone.  No hydronephrosis.     Urinary bladder is incompletely fluid-filled at the time of scanning which limits assessment, otherwise unremarkable.     Impression:     No hydronephrosis.     Bilateral renal echogenic foci suggesting nonobstructing stones.        Electronically signed by:Abran Jacobo  Date:                                            04/01/2025  Time:                                            08:44        Exam Ended: 03/31/25 18:23 CDT       CT Renal Stone Study ABD Pelvis WO  Order: [id number]   Status: Final result       Next appt: Today at 09:00 AM in Urology (Don Elkins MD)    Test Result Released: No (inaccessible in MyChart)    0 Result Notes  Details    Reading Physician Reading Date Result Priority   Cookie Gonsalez MD  [phone]  3/16/2025 STAT     Narrative & Impression  EXAMINATION:  CT RENAL STONE STUDY ABD PELVIS WO     CLINICAL HISTORY:  Flank pain, kidney stone suspected;hematuria with flank pain;Pain abdominal unsp. (789.00);     TECHNIQUE:  Low dose axial images, sagittal and coronal reformations were obtained from the lung bases to the pubic symphysis.  Contrast was not administered.     COMPARISON:  None     FINDINGS:  The lung bases are clear.  No pericardial effusion.     The noncontrast appearance of the liver appears normal.  The gallbladder is present, no radiopaque stones seen within.     The noncontrast appearance of the pancreas, spleen, bilateral adrenal glands appear normal.  Punctate foci of hyperattenuation within the right kidney suggestive of tiny forming stones.  Slight prominence of the left collecting system.  Punctate calcification at the left hemipelvis could represent a urolith, about 3 mm.     Mild stool retention, no bowel dilation.  No inflammatory changes of the bowel seen.  The appendix is not definitely identified.  Small focus of high density material noted within the colon. Trace of free fluid in the pelvis, no free air.     The abdominal wall is intact.  The inguinal regions appear normal.     The osseous structures appear within normal limits.     Impression:     3 mm distal left ureteral stone with mild hydronephrosis/hydroureter..     Nonobstructive punctate right forming renal stones.        Electronically signed by:Cookie Gonsalez MD  Date:                                            03/16/2025  Time:                                            07:33        Exam Ended: 03/16/25 06:23 CDT Last Resulted: 03/16/25 07:33 CDT           Assessment:       1. Ureteral stone    2. Left flank pain              Plan:       1. Ureteral stone (Primary)  Cystoscopy with left ureteroscopy, laser lithotripsy, stent placement on Monday 4/7/2025    2. Left flank pain  As above             Follow up in about 4 weeks (around 5/2/2025) for Follow up Established.

## 2025-04-04 NOTE — H&P (VIEW-ONLY)
"Subjective:       Patient ID: Jakob Arboleda is a 19 y.o. male The patient's last visit with me was on 3/28/2025.     Chief Complaint:   Chief Complaint   Patient presents with    Follow-up     1w US result       Urolithiasis  Patient complains of left abdominal pain with radiation to the abdomen. Onset of symptoms was abrupt starting a few days ago with stable course since that time. Patient describes the pain as aching and colicky, intermittent and rated as moderate. The patient has had nausea and vomiting and no diaphoresis. There has been no fever or chills. The patient is complaining of dysuria or frequency. Risk factors for urolithiasis: poor fluid intake.    He had hypospadias as a child.  He had a repair using foreskin.    3/28/2025  He would like to have more time to pass the stone.  He has run out of Flomax.   He continues to have some left sided pain and has note some right sided pain.  No fever.    04/04/2025  He continues to have left sided pain and pain with urination.  He denies hematuria or fever.      ACTIVE MEDICAL ISSUES:  [Problem List]    [Problem List]  Patient Active Problem List  There is no problem list on file for this patient.     PAST MEDICAL HISTORY  History reviewed. No pertinent past medical history.    PAST SURGICAL HISTORY:  History reviewed. No pertinent surgical history.    SOCIAL HISTORY:  [Social History]    [Social History]  Tobacco Use    Smoking status: Never       FAMILY HISTORY:  No family history on file.    ALLERGIES AND MEDICATIONS: updated and reviewed.  Review of patient's allergies indicates:   Allergen Reactions    Penicillins Swelling     Current Outpatient Medications   Medication Sig    acetaminophen (TYLENOL) 500 MG tablet Take 1 tablet (500 mg total) by mouth every 6 (six) hours as needed for Pain (and fever).    elastic bandage 2 X 5 "-yard Bn Apply 1 application topically continuous. Applied in office    famotidine (PEPCID) 20 MG tablet Take 1 tablet (20 mg " total) by mouth 2 (two) times daily. for 10 days    ibuprofen (ADVIL,MOTRIN) 600 MG tablet Take 1 tablet (600 mg total) by mouth every 6 (six) hours as needed for Pain (Take with food as needed for mild-to-moderate pain).    oxyCODONE-acetaminophen (PERCOCET) 5-325 mg per tablet Take 1 tablet by mouth every 6 (six) hours as needed for Pain (As needed for severe 10/10 pain).    tamsulosin (FLOMAX) 0.4 mg Cap TAKE 1 CAPSULE(0.4 MG) BY MOUTH DAILY     No current facility-administered medications for this visit.       Review of Systems   Constitutional:  Negative for chills and fever.   HENT:  Negative for congestion.    Respiratory:  Negative for chest tightness and shortness of breath.    Cardiovascular:  Negative for chest pain and palpitations.   Gastrointestinal:  Negative for abdominal pain, constipation, diarrhea, nausea and vomiting.   Genitourinary:  Negative for difficulty urinating, dysuria, flank pain, hematuria and urgency.   Musculoskeletal:  Negative for arthralgias.   Neurological:  Negative for dizziness.   Psychiatric/Behavioral:  Negative for confusion.        Objective:      Vitals:    04/04/25 1321   Weight: 50 kg (110 lb 3.7 oz)       Physical Exam  Vitals and nursing note reviewed.   Constitutional:       Appearance: He is well-developed.   HENT:      Head: Normocephalic.   Eyes:      Conjunctiva/sclera: Conjunctivae normal.   Neck:      Thyroid: No thyromegaly.      Trachea: No tracheal deviation.   Cardiovascular:      Rate and Rhythm: Normal rate.      Heart sounds: Normal heart sounds.   Pulmonary:      Effort: Pulmonary effort is normal. No respiratory distress.      Breath sounds: Normal breath sounds. No wheezing.   Abdominal:      General: Bowel sounds are normal.      Palpations: Abdomen is soft.      Tenderness: There is no abdominal tenderness. There is no rebound.      Hernia: No hernia is present.   Musculoskeletal:         General: No tenderness. Normal range of motion.      Cervical  back: Normal range of motion and neck supple.   Lymphadenopathy:      Cervical: No cervical adenopathy.   Skin:     General: Skin is warm and dry.      Findings: No erythema or rash.   Neurological:      Mental Status: He is alert and oriented to person, place, and time.   Psychiatric:         Behavior: Behavior normal.         Thought Content: Thought content normal.         Judgment: Judgment normal.         Urine dipstick shows negative for all components.  Micro exam: negative for WBC's or RBC's.    US Retroperitoneal Complete  Order: [id number]   Status: Final result       Next appt: Today at 01:30 PM in Urology (Don Elkins MD)       Dx: Ureteral stone; Left flank pain    Test Result Released: Yes (not seen)    0 Result Notes  Details    Reading Physician Reading Date Result Priority   Abran Jacobo MD  [phone]  4/1/2025 Routine     Narrative & Impression  EXAMINATION:  US RETROPERITONEAL COMPLETE     CLINICAL HISTORY:  Calculus of ureter     TECHNIQUE:  Ultrasound of the kidneys and urinary bladder was performed including color flow and Doppler evaluation of the kidneys.     COMPARISON:  CT renal stone study dated 03/16/2025     FINDINGS:  Right kidney: Measures 10.2 cm.  Resistive index 0.53.  Corticomedullary distinction appears maintained.  Echogenic foci at 3 mm and 2 mm respectively suggesting nonobstructing stones.  No hydronephrosis.     Left kidney: Measures 10.0 cm.  Resistive index of 0.51.  Corticomedullary distinction appears maintained.  Echogenic 4 mm focus suggesting nonobstructing stone.  No hydronephrosis.     Urinary bladder is incompletely fluid-filled at the time of scanning which limits assessment, otherwise unremarkable.     Impression:     No hydronephrosis.     Bilateral renal echogenic foci suggesting nonobstructing stones.        Electronically signed by:Abran Jacobo  Date:                                            04/01/2025  Time:                                            08:44        Exam Ended: 03/31/25 18:23 CDT       CT Renal Stone Study ABD Pelvis WO  Order: [id number]   Status: Final result       Next appt: Today at 09:00 AM in Urology (Don Elkins MD)    Test Result Released: No (inaccessible in MyChart)    0 Result Notes  Details    Reading Physician Reading Date Result Priority   Cookie Gonsalez MD  [phone]  3/16/2025 STAT     Narrative & Impression  EXAMINATION:  CT RENAL STONE STUDY ABD PELVIS WO     CLINICAL HISTORY:  Flank pain, kidney stone suspected;hematuria with flank pain;Pain abdominal unsp. (789.00);     TECHNIQUE:  Low dose axial images, sagittal and coronal reformations were obtained from the lung bases to the pubic symphysis.  Contrast was not administered.     COMPARISON:  None     FINDINGS:  The lung bases are clear.  No pericardial effusion.     The noncontrast appearance of the liver appears normal.  The gallbladder is present, no radiopaque stones seen within.     The noncontrast appearance of the pancreas, spleen, bilateral adrenal glands appear normal.  Punctate foci of hyperattenuation within the right kidney suggestive of tiny forming stones.  Slight prominence of the left collecting system.  Punctate calcification at the left hemipelvis could represent a urolith, about 3 mm.     Mild stool retention, no bowel dilation.  No inflammatory changes of the bowel seen.  The appendix is not definitely identified.  Small focus of high density material noted within the colon. Trace of free fluid in the pelvis, no free air.     The abdominal wall is intact.  The inguinal regions appear normal.     The osseous structures appear within normal limits.     Impression:     3 mm distal left ureteral stone with mild hydronephrosis/hydroureter..     Nonobstructive punctate right forming renal stones.        Electronically signed by:Cookie Gonsalez MD  Date:                                            03/16/2025  Time:                                            07:33        Exam Ended: 03/16/25 06:23 CDT Last Resulted: 03/16/25 07:33 CDT           Assessment:       1. Ureteral stone    2. Left flank pain              Plan:       1. Ureteral stone (Primary)  Cystoscopy with left ureteroscopy, laser lithotripsy, stent placement on Monday 4/7/2025    2. Left flank pain  As above             Follow up in about 4 weeks (around 5/2/2025) for Follow up Established.

## 2025-04-04 NOTE — DISCHARGE INSTRUCTIONS
YOUR PROCEDURE WILL BE AT [facility] at [address] [place] [address]                 Enter through the Main Entrance facing [address].      Your procedure  is scheduled for ___4/7/2025__at 11:30 am_____.    You may have up to three visitors.  No children under 18 years old.     You will be going to the Same Day Surgery Unit on the 2nd floor of the hospital.    Report to the Same Day Surgery Registration Desk in the hallway.(Just beside the Same Day Surgery Unit)    Important instructions:  Do not eat anything after midnight.  You may have plain water, non carbonated.  You may also have Gatorade or Powerade after midnight.    Stop all fluids 2 hours before your surgery.    It is okay to brush your teeth.  Do not have gum, candy or mints.    SEE MEDICATION SHEET.   TAKE MEDICATIONS AS DIRECTED.      STOP taking for 7 days before surgery:    Aspirin           Voltaren (Diclofenac)  Ibuprofen  (Advil, Motrin)                Indomethocin  Mobic (meloxicam, celebrex)      Etodolac   Aleve (naproxen)          Toradol (ketoralac)  Fish oil, Krill oil and Vitamin E  Headache Powders (BC Powder, Goody's Powder, Stanback)                           You may take Tylenol if needed which is not a blood thinner.    Please shower the night before and the morning of your surgery.      Contact lenses and removable denture work may not be worn during your procedure.    You may wear deodorant only. If you are having breast surgery, do not wear deodorant on the operative side.    Do not wear powder, body lotion, perfume/cologne or make-up.    Do not wear any jewelry or have any metal on your body.    You will be asked to remove any dentures or partials for the procedure.    If you are going home on the same day of surgery, you must arrange for a family member or a friend to drive you home.  Public transportation is prohibited.  You will not be able to drive home if you were given anesthesia  or sedation.    Patients who want to have their Post-op prescriptions filled from our in-house [facility], bring a Credit/Debit Card or cash with you. A co-pay may be required.  The pharmacy closes at 5:30 pm.    Wear loose fitting clothes allowing for bandages.    Please leave money and valuables home.      You may bring your cell phone.    Call the doctor if fever or illness should occur before your surgery.    Call [phone] to contact us here if needed.                            CLOTHES ON DAY OF SURGERY    SHOULDER surgery:  you must have a very oversized shirt.  Very, Very large.  You will probably have a large sling on with your arm strapped to your chest.  You will not be able to put the arm of the operated shoulder into a sleeve.  You can put the arm of the un-operated shoulder into the sleeve, but the shirt will need to be draped over the operated shoulder.       ARM or HAND surgery:  make sure that your sleeves are large and loose enough to pass over large dressings or cast.      BREAST or UNDERARM surgery:  wear a loose, button down shirt so that you can dress without raising your arms over your head.    ABDOMINAL surgery:  wear loose, comfortable clothing.  Nothing tight around the abdomen.  NO JEANS    PENIS or SCROTAL surgery:  loose comfortable clothing.  Large sweat pants, pajama pants or a robe.  ABSOLUTELY NO JEANS      LEG or FOOT surgery:  wear large loose pants that are able to pass over any large dressings or casts.  You could also wear loose shorts or a skirt.

## 2025-04-04 NOTE — ANESTHESIA PREPROCEDURE EVALUATION
04/04/2025  Jakob Arboleda is a 19 y.o., male   To undergo Procedure(s) (LRB):  CYSTOURETEROSCOPY, WITH HOLMIUM LASER LITHOTRIPSY OF URETERAL CALCULUS AND STENT INSERTION (Left)     Denies CP/SOB/GERD/MI/CVA/URI symptoms.  METS > 4  NPO > 8    Past Medical History:  Past Medical History:   Diagnosis Date    Digestive disorder     Renal calculi        Past Surgical History:  Past Surgical History:   Procedure Laterality Date    PENIS SURGERY      childhood       Social History:  Social History[1]    Medications:  Medications Ordered Prior to Encounter[2]    Allergies:  Review of patient's allergies indicates:   Allergen Reactions    Penicillins Swelling       Active Problems:  Problem List[3]    24 Hour Vitals:      See Nursing Charting For Additional Vitals      Pre-op Assessment    I have reviewed the Patient Summary Reports.     I have reviewed the Nursing Notes. I have reviewed the NPO Status.   I have reviewed the Medications.     Review of Systems  Anesthesia Hx:  No problems with previous Anesthesia             Denies Family Hx of Anesthesia complications.    Denies Personal Hx of Anesthesia complications.                    Social:  No Alcohol Use, Recreational Drugs, Non-Smoker Marijuana      Cardiovascular:  Exercise tolerance: good                     Functional Capacity good / => 4 METS                         Pulmonary:  Pulmonary Normal                       Renal/:   renal calculi  Ureteral stone             Hepatic/GI:  Hepatic/GI Normal                    Neurological:  Neurology Normal                                      Endocrine:  Endocrine Normal                Physical Exam  General: Well nourished and Cooperative    Airway:  Mallampati: II   Mouth Opening: Normal  TM Distance: Normal    Dental:  Intact    Chest/Lungs:  Clear to auscultation, Normal Respiratory Rate    Heart:  Rate: Normal  Rhythm: Regular Rhythm        Anesthesia Plan  Type of Anesthesia, risks & benefits  discussed:    Anesthesia Type: Gen ETT  Intra-op Monitoring Plan: Standard ASA Monitors  Post Op Pain Control Plan: multimodal analgesia and IV/PO Opioids PRN  Induction:  IV  Airway Plan: Direct and Video, Post-Induction  Informed Consent: Informed consent signed with the Patient and all parties understand the risks and agree with anesthesia plan.  All questions answered. Patient consented to blood products? Yes  ASA Score: 2  Anesthesia Plan Notes:   GA with OETT  Standard ASA monitors  Recovery in PACU  PONV: 2    Ready For Surgery From Anesthesia Perspective.     .           [1]   Social History  Socioeconomic History    Marital status: Single   Tobacco Use    Smoking status: Never   Substance and Sexual Activity    Alcohol use: Not Currently    Drug use: Yes     Types: Marijuana   [2]   No current facility-administered medications on file prior to encounter.     Current Outpatient Medications on File Prior to Encounter   Medication Sig Dispense Refill    acetaminophen (TYLENOL) 500 MG tablet Take 1 tablet (500 mg total) by mouth every 6 (six) hours as needed for Pain (and fever). 30 tablet 0    ibuprofen (ADVIL,MOTRIN) 600 MG tablet Take 1 tablet (600 mg total) by mouth every 6 (six) hours as needed for Pain (Take with food as needed for mild-to-moderate pain). 20 tablet 0    tamsulosin (FLOMAX) 0.4 mg Cap TAKE 1 CAPSULE(0.4 MG) BY MOUTH DAILY 90 capsule 3   [3] There is no problem list on file for this patient.

## 2025-04-07 ENCOUNTER — ANESTHESIA (OUTPATIENT)
Dept: SURGERY | Facility: HOSPITAL | Age: 20
End: 2025-04-07
Payer: MEDICAID

## 2025-04-07 ENCOUNTER — HOSPITAL ENCOUNTER (OUTPATIENT)
Facility: HOSPITAL | Age: 20
Discharge: HOME | End: 2025-04-07
Attending: UROLOGY | Admitting: UROLOGY
Payer: MEDICAID

## 2025-04-07 VITALS
TEMPERATURE: 97 F | RESPIRATION RATE: 16 BRPM | OXYGEN SATURATION: 100 % | DIASTOLIC BLOOD PRESSURE: 64 MMHG | SYSTOLIC BLOOD PRESSURE: 127 MMHG | HEART RATE: 56 BPM | BODY MASS INDEX: 18.69 KG/M2 | WEIGHT: 108.94 LBS

## 2025-04-07 DIAGNOSIS — N20.1 URETERAL STONE: Primary | ICD-10-CM

## 2025-04-07 LAB
ABSOLUTE EOSINOPHIL (OHS): 0.06 K/UL
ABSOLUTE MONOCYTE (OHS): 0.37 K/UL (ref 0.3–1)
ABSOLUTE NEUTROPHIL COUNT (OHS): 3.7 K/UL (ref 1.8–7.7)
ANION GAP (OHS): 12 MMOL/L (ref 8–16)
BASOPHILS # BLD AUTO: 0.02 K/UL
BASOPHILS NFR BLD AUTO: 0.3 %
BUN SERPL-MCNC: 13 MG/DL (ref 6–20)
CALCIUM SERPL-MCNC: 9.4 MG/DL (ref 8.7–10.5)
CHLORIDE SERPL-SCNC: 107 MMOL/L (ref 95–110)
CO2 SERPL-SCNC: 22 MMOL/L (ref 23–29)
CREAT SERPL-MCNC: 0.9 MG/DL (ref 0.5–1.4)
ERYTHROCYTE [DISTWIDTH] IN BLOOD BY AUTOMATED COUNT: 12.7 % (ref 11.5–14.5)
GFR SERPLBLD CREATININE-BSD FMLA CKD-EPI: >60 ML/MIN/1.73/M2
GLUCOSE SERPL-MCNC: 93 MG/DL (ref 70–110)
HCT VFR BLD AUTO: 41.3 % (ref 40–54)
HGB BLD-MCNC: 14.3 GM/DL (ref 14–18)
IMM GRANULOCYTES # BLD AUTO: 0.02 K/UL (ref 0–0.04)
IMM GRANULOCYTES NFR BLD AUTO: 0.3 % (ref 0–0.5)
LYMPHOCYTES # BLD AUTO: 2.08 K/UL (ref 1–4.8)
MCH RBC QN AUTO: 31 PG (ref 27–31)
MCHC RBC AUTO-ENTMCNC: 34.6 G/DL (ref 32–36)
MCV RBC AUTO: 89 FL (ref 82–98)
NUCLEATED RBC (/100WBC) (OHS): 0 /100 WBC
PLATELET # BLD AUTO: 150 K/UL (ref 150–450)
PMV BLD AUTO: 11.1 FL (ref 9.2–12.9)
POTASSIUM SERPL-SCNC: 3.8 MMOL/L (ref 3.5–5.1)
RBC # BLD AUTO: 4.62 M/UL (ref 4.6–6.2)
RELATIVE EOSINOPHIL (OHS): 1 %
RELATIVE LYMPHOCYTE (OHS): 33.3 % (ref 18–48)
RELATIVE MONOCYTE (OHS): 5.9 % (ref 4–15)
RELATIVE NEUTROPHIL (OHS): 59.2 % (ref 38–73)
SODIUM SERPL-SCNC: 141 MMOL/L (ref 136–145)
WBC # BLD AUTO: 6.25 K/UL (ref 3.9–12.7)

## 2025-04-07 PROCEDURE — 37000009 HC ANESTHESIA EA ADD 15 MINS: Performed by: UROLOGY

## 2025-04-07 PROCEDURE — 71000015 HC POSTOP RECOV 1ST HR: Performed by: UROLOGY

## 2025-04-07 PROCEDURE — 63600175 PHARM REV CODE 636 W HCPCS: Performed by: ANESTHESIOLOGY

## 2025-04-07 PROCEDURE — 36000706: Performed by: UROLOGY

## 2025-04-07 PROCEDURE — 52332 CYSTOSCOPY AND TREATMENT: CPT | Mod: LT,,, | Performed by: UROLOGY

## 2025-04-07 PROCEDURE — C1758 CATHETER, URETERAL: HCPCS | Performed by: UROLOGY

## 2025-04-07 PROCEDURE — 63600175 PHARM REV CODE 636 W HCPCS: Performed by: STUDENT IN AN ORGANIZED HEALTH CARE EDUCATION/TRAINING PROGRAM

## 2025-04-07 PROCEDURE — 36415 COLL VENOUS BLD VENIPUNCTURE: CPT | Performed by: UROLOGY

## 2025-04-07 PROCEDURE — 74420 UROGRAPHY RTRGR +-KUB: CPT | Mod: 26,,, | Performed by: UROLOGY

## 2025-04-07 PROCEDURE — 85025 COMPLETE CBC W/AUTO DIFF WBC: CPT | Performed by: UROLOGY

## 2025-04-07 PROCEDURE — 25500020 PHARM REV CODE 255: Performed by: UROLOGY

## 2025-04-07 PROCEDURE — 25000003 PHARM REV CODE 250: Performed by: UROLOGY

## 2025-04-07 PROCEDURE — 82310 ASSAY OF CALCIUM: CPT | Performed by: UROLOGY

## 2025-04-07 PROCEDURE — 71000033 HC RECOVERY, INTIAL HOUR: Performed by: UROLOGY

## 2025-04-07 PROCEDURE — 25000003 PHARM REV CODE 250: Performed by: STUDENT IN AN ORGANIZED HEALTH CARE EDUCATION/TRAINING PROGRAM

## 2025-04-07 PROCEDURE — 37000008 HC ANESTHESIA 1ST 15 MINUTES: Performed by: UROLOGY

## 2025-04-07 PROCEDURE — 63600175 PHARM REV CODE 636 W HCPCS: Performed by: UROLOGY

## 2025-04-07 PROCEDURE — 52351 CYSTOURETERO & OR PYELOSCOPE: CPT | Mod: ,,, | Performed by: UROLOGY

## 2025-04-07 PROCEDURE — C1769 GUIDE WIRE: HCPCS | Performed by: UROLOGY

## 2025-04-07 PROCEDURE — C2617 STENT, NON-COR, TEM W/O DEL: HCPCS | Performed by: UROLOGY

## 2025-04-07 PROCEDURE — 36000707: Performed by: UROLOGY

## 2025-04-07 DEVICE — STENT URETERAL UNIV 6FR 26CM: Type: IMPLANTABLE DEVICE | Site: URETER | Status: FUNCTIONAL

## 2025-04-07 RX ORDER — LIDOCAINE HYDROCHLORIDE 20 MG/ML
INJECTION INTRAVENOUS
Status: DISCONTINUED | OUTPATIENT
Start: 2025-04-07 | End: 2025-04-07

## 2025-04-07 RX ORDER — OXYCODONE AND ACETAMINOPHEN 5; 325 MG/1; MG/1
1 TABLET ORAL EVERY 4 HOURS PRN
Status: ON HOLD | COMMUNITY
End: 2025-04-07

## 2025-04-07 RX ORDER — ONDANSETRON HYDROCHLORIDE 2 MG/ML
INJECTION, SOLUTION INTRAVENOUS
Status: DISCONTINUED | OUTPATIENT
Start: 2025-04-07 | End: 2025-04-07

## 2025-04-07 RX ORDER — MIDAZOLAM HYDROCHLORIDE 1 MG/ML
INJECTION INTRAMUSCULAR; INTRAVENOUS
Status: DISCONTINUED | OUTPATIENT
Start: 2025-04-07 | End: 2025-04-07

## 2025-04-07 RX ORDER — HYDROMORPHONE HYDROCHLORIDE 2 MG/ML
0.2 INJECTION, SOLUTION INTRAMUSCULAR; INTRAVENOUS; SUBCUTANEOUS EVERY 5 MIN PRN
Status: DISCONTINUED | OUTPATIENT
Start: 2025-04-07 | End: 2025-04-07 | Stop reason: HOSPADM

## 2025-04-07 RX ORDER — PHENAZOPYRIDINE HYDROCHLORIDE 200 MG/1
200 TABLET, FILM COATED ORAL 3 TIMES DAILY PRN
Qty: 21 TABLET | Refills: 0 | Status: SHIPPED | OUTPATIENT
Start: 2025-04-07

## 2025-04-07 RX ORDER — HYDROCODONE BITARTRATE AND ACETAMINOPHEN 10; 325 MG/1; MG/1
1 TABLET ORAL EVERY 4 HOURS PRN
Refills: 0 | Status: CANCELLED | OUTPATIENT
Start: 2025-04-07

## 2025-04-07 RX ORDER — CEFAZOLIN 2 G/1
2 INJECTION, POWDER, FOR SOLUTION INTRAMUSCULAR; INTRAVENOUS
Status: COMPLETED | OUTPATIENT
Start: 2025-04-07 | End: 2025-04-07

## 2025-04-07 RX ORDER — HYDROCODONE BITARTRATE AND ACETAMINOPHEN 5; 325 MG/1; MG/1
1 TABLET ORAL EVERY 4 HOURS PRN
Refills: 0 | Status: CANCELLED | OUTPATIENT
Start: 2025-04-07

## 2025-04-07 RX ORDER — PHENAZOPYRIDINE HYDROCHLORIDE 100 MG/1
200 TABLET, FILM COATED ORAL ONCE
Status: COMPLETED | OUTPATIENT
Start: 2025-04-07 | End: 2025-04-07

## 2025-04-07 RX ORDER — GLUCAGON 1 MG
1 KIT INJECTION
Status: DISCONTINUED | OUTPATIENT
Start: 2025-04-07 | End: 2025-04-07 | Stop reason: HOSPADM

## 2025-04-07 RX ORDER — SODIUM CHLORIDE 0.9 % (FLUSH) 0.9 %
10 SYRINGE (ML) INJECTION
Status: DISCONTINUED | OUTPATIENT
Start: 2025-04-07 | End: 2025-04-07 | Stop reason: HOSPADM

## 2025-04-07 RX ORDER — PROPOFOL 10 MG/ML
VIAL (ML) INTRAVENOUS
Status: DISCONTINUED | OUTPATIENT
Start: 2025-04-07 | End: 2025-04-07

## 2025-04-07 RX ORDER — FENTANYL CITRATE 50 UG/ML
INJECTION, SOLUTION INTRAMUSCULAR; INTRAVENOUS
Status: DISCONTINUED | OUTPATIENT
Start: 2025-04-07 | End: 2025-04-07

## 2025-04-07 RX ORDER — PROCHLORPERAZINE EDISYLATE 5 MG/ML
5 INJECTION INTRAMUSCULAR; INTRAVENOUS EVERY 30 MIN PRN
Status: DISCONTINUED | OUTPATIENT
Start: 2025-04-07 | End: 2025-04-07 | Stop reason: HOSPADM

## 2025-04-07 RX ORDER — OXYCODONE AND ACETAMINOPHEN 5; 325 MG/1; MG/1
1 TABLET ORAL EVERY 4 HOURS PRN
Qty: 20 TABLET | Refills: 0 | Status: SHIPPED | OUTPATIENT
Start: 2025-04-07

## 2025-04-07 RX ORDER — DEXAMETHASONE SODIUM PHOSPHATE 4 MG/ML
INJECTION, SOLUTION INTRA-ARTICULAR; INTRALESIONAL; INTRAMUSCULAR; INTRAVENOUS; SOFT TISSUE
Status: DISCONTINUED | OUTPATIENT
Start: 2025-04-07 | End: 2025-04-07

## 2025-04-07 RX ORDER — DEXMEDETOMIDINE HYDROCHLORIDE 100 UG/ML
INJECTION, SOLUTION INTRAVENOUS
Status: DISCONTINUED | OUTPATIENT
Start: 2025-04-07 | End: 2025-04-07

## 2025-04-07 RX ORDER — ACETAMINOPHEN 500 MG
1000 TABLET ORAL
Status: DISCONTINUED | OUTPATIENT
Start: 2025-04-07 | End: 2025-04-07 | Stop reason: HOSPADM

## 2025-04-07 RX ORDER — SODIUM CHLORIDE, SODIUM LACTATE, POTASSIUM CHLORIDE, CALCIUM CHLORIDE 600; 310; 30; 20 MG/100ML; MG/100ML; MG/100ML; MG/100ML
INJECTION, SOLUTION INTRAVENOUS CONTINUOUS
Status: DISCONTINUED | OUTPATIENT
Start: 2025-04-07 | End: 2025-04-07 | Stop reason: HOSPADM

## 2025-04-07 RX ORDER — ONDANSETRON HYDROCHLORIDE 2 MG/ML
4 INJECTION, SOLUTION INTRAVENOUS DAILY PRN
Status: DISCONTINUED | OUTPATIENT
Start: 2025-04-07 | End: 2025-04-07 | Stop reason: HOSPADM

## 2025-04-07 RX ORDER — ROCURONIUM BROMIDE 10 MG/ML
INJECTION, SOLUTION INTRAVENOUS
Status: DISCONTINUED | OUTPATIENT
Start: 2025-04-07 | End: 2025-04-07

## 2025-04-07 RX ADMIN — HYDROMORPHONE HYDROCHLORIDE 0.2 MG: 2 INJECTION, SOLUTION INTRAMUSCULAR; INTRAVENOUS; SUBCUTANEOUS at 11:04

## 2025-04-07 RX ADMIN — SUGAMMADEX 200 MG: 100 INJECTION, SOLUTION INTRAVENOUS at 11:04

## 2025-04-07 RX ADMIN — CEFAZOLIN 2 G: 2 INJECTION, POWDER, FOR SOLUTION INTRAMUSCULAR; INTRAVENOUS at 10:04

## 2025-04-07 RX ADMIN — DEXAMETHASONE SODIUM PHOSPHATE 8 MG: 4 INJECTION, SOLUTION INTRAMUSCULAR; INTRAVENOUS at 10:04

## 2025-04-07 RX ADMIN — ONDANSETRON 4 MG: 2 INJECTION, SOLUTION INTRAMUSCULAR; INTRAVENOUS at 10:04

## 2025-04-07 RX ADMIN — SODIUM CHLORIDE, SODIUM LACTATE, POTASSIUM CHLORIDE, AND CALCIUM CHLORIDE: .6; .31; .03; .02 INJECTION, SOLUTION INTRAVENOUS at 10:04

## 2025-04-07 RX ADMIN — LIDOCAINE HYDROCHLORIDE 100 MG: 20 INJECTION, SOLUTION INTRAVENOUS at 10:04

## 2025-04-07 RX ADMIN — PHENAZOPYRIDINE 200 MG: 100 TABLET ORAL at 11:04

## 2025-04-07 RX ADMIN — DEXMEDETOMIDINE HYDROCHLORIDE 8 MCG: 100 INJECTION, SOLUTION, CONCENTRATE INTRAVENOUS at 10:04

## 2025-04-07 RX ADMIN — MIDAZOLAM HYDROCHLORIDE 2 MG: 1 INJECTION INTRAMUSCULAR; INTRAVENOUS at 10:04

## 2025-04-07 RX ADMIN — ROCURONIUM BROMIDE 50 MG: 10 INJECTION INTRAVENOUS at 10:04

## 2025-04-07 RX ADMIN — PROPOFOL 30 MG: 10 INJECTION, EMULSION INTRAVENOUS at 10:04

## 2025-04-07 RX ADMIN — PROPOFOL 120 MG: 10 INJECTION, EMULSION INTRAVENOUS at 10:04

## 2025-04-07 RX ADMIN — FENTANYL CITRATE 100 MCG: 50 INJECTION, SOLUTION INTRAMUSCULAR; INTRAVENOUS at 10:04

## 2025-04-07 NOTE — TRANSFER OF CARE
Anesthesia Transfer of Care Note    Patient: Jakob Arboleda    Procedure(s) Performed: Procedure(s) (LRB):  CYSTOURETEROSCOPY, RETROGRADE PYELOGRAM, URETERAL STENT PLACEMENT (Left)    Patient location: PACU    Anesthesia Type: general    Transport from OR: Transported from OR on room air with adequate spontaneous ventilation    Post pain: adequate analgesia    Post assessment: no apparent anesthetic complications and tolerated procedure well    Post vital signs: stable    Level of consciousness: awake and alert    Nausea/Vomiting: no nausea/vomiting    Complications: none    Transfer of care protocol was followed      Last vitals: Visit Vitals  /64   Pulse 67   Temp (!) 38 °C (100.4 °F)   Resp 18   Wt 49.4 kg (108 lb 14.5 oz)   SpO2 99%   BMI 18.69 kg/m²

## 2025-04-07 NOTE — DISCHARGE INSTRUCTIONS
Management of your stent    You have a stent in your ureter.  This must be removed.  Please remove the stent at home on Thursday 4/10/2025 by removing the tape and pulling the string.  If you have questions or concerns please call [phone].     Treating Kidney Stones: Ureteroscopic Stone Removal   Ureteroscopic stone removal may be done before, after, or instead of other treatments. If you need this procedure, your healthcare provider will discuss its risks and possible complications. You will be told how to prepare. And you will be told about anesthesia that will keep you pain-free during treatment.   Removing the stone through the ureter   Ureteroscopic stone removal extracts a small stone in your ureter without an incision. The ureter is the tube that urine flows through from the kidney to the bladder. Your doctor places a viewing tube (ureteroscope) in your ureter. A wire basket inserted through the tube removes the stone. Sometimes, a laser or a mechanical device is used to break up the stone. A soft tube may be left in your ureter briefly to drain urine.      Your recovery  This is an outpatient or overnight procedure. For a few days after surgery, you may feel some pain when you urinate. Or you may need to urinate more often, or have bloody urine. You may have a ureteral stent. This is a soft tube that prevents blockage from swelling after the procedure. The stent is removed when the swelling goes down, often within days. Follow up as instructed to check for any new stones.   When to call your healthcare provider  Call your healthcare provider right away if:   You have sudden pain or flank pain   You have a fever over 100.4°F (38°C) or as advised by your health care provider   You have nausea that lasts longer than suggested in your discharge instructions   You have heavy bleeding when you urinate   You have heavy bleeding through your drainage tube   You have swelling or redness around your incision

## 2025-04-07 NOTE — BRIEF OP NOTE
[facility] [place] Note    Surgery Date: 4/7/2025     Surgeons and Role:     * Don Elkins MD - Primary    Assisting Surgeon: None    Pre-op Diagnosis:  Ureteral stone [N20.1]    Post-op Diagnosis:  Post-Op Diagnosis Codes:     * Ureteral stone [N20.1]    Procedure(s) (LRB):  CYSTOURETEROSCOPY, RETROGRADE PYELOGRAM, URETERAL STENT PLACEMENT (Left)    Anesthesia: General    Operative Findings: ureteral stone had passed, stent with string.    Estimated Blood Loss: * No values recorded between 4/7/2025 10:30 AM and 4/7/2025 11:07 AM *         Specimens:   Specimen (24h ago, onward)      None            * No specimens in log *        Discharge Note    OUTCOME: Patient tolerated treatment/procedure well without complication and is now ready for discharge.    DISPOSITION: Home or Self Care    FINAL DIAGNOSIS:  <principal problem not specified>    FOLLOWUP: In clinic    DISCHARGE INSTRUCTIONS:    Discharge Procedure Orders   Diet general     Call MD for:   Order Comments: Significant Hematuria

## 2025-04-07 NOTE — DISCHARGE SUMMARY
[facility] - [place] - Surgery  Discharge Note  Short Stay    Procedure(s) (LRB):  CYSTOURETEROSCOPY, RETROGRADE PYELOGRAM, URETERAL STENT PLACEMENT (Left)      OUTCOME: Patient tolerated treatment/procedure well without complication and is now ready for discharge.    DISPOSITION: Home or Self Care    FINAL DIAGNOSIS:  <principal problem not specified>    FOLLOWUP: In clinic    DISCHARGE INSTRUCTIONS:    Discharge Procedure Orders   Diet general     Call MD for:   Order Comments: Significant Hematuria        TIME SPENT ON DISCHARGE: 20 minutes    [facility] [place]  Urology  Discharge Summary      Patient Name: Jakob Arboleda   MRN: [id number]  Admission Date: 04/07/2025   Hospital Length of Stay: 0 days  Discharge Date and Time: 04/07/2025 11:08 AM  Attending Physician: Don Elkins, *   Discharging Provider: JOSE JUAN Elkins MD  Primary Care Physician: Joann, Primary Doctor      HPI: Patient was admitted for an outpatient procedure and tolerated the procedure well with no complications.     Procedures: Procedure(s):  CYSTOURETEROSCOPY, RETROGRADE PYELOGRAM, URETERAL STENT PLACEMENT        Indwelling Lines/Drains at time of discharge:           Hospital Course (synopsis of major diagnoses, care, treatment, and services provided during the course of the hospital stay): Patient was admitted for an outpatient procedure and tolerated the procedure well with no complications.         Final Active Diagnoses:    Diagnosis Date Noted POA    <principal problem not specified>   04/07/2025  Yes      Problems Resolved During this Admission:       Discharged Condition: stable    Disposition: Home or Self Care    Follow Up:     Patient Instructions:      Diet general     Call MD for:   Order Comments: Significant Hematuria     Medications:  Reconciled Home Medications:      Medication List        START taking these medications      phenazopyridine 200 MG tablet  Commonly known as: PYRIDIUM  Take 1 tablet (200 mg total) by  mouth 3 (three) times daily as needed for Pain (Burning).            CONTINUE taking these medications      acetaminophen 500 MG tablet  Commonly known as: TYLENOL  Take 1 tablet (500 mg total) by mouth every 6 (six) hours as needed for Pain (and fever).     ibuprofen 600 MG tablet  Commonly known as: ADVIL,MOTRIN  Take 1 tablet (600 mg total) by mouth every 6 (six) hours as needed for Pain (Take with food as needed for mild-to-moderate pain).     oxyCODONE-acetaminophen 5-325 mg per tablet  Commonly known as: PERCOCET  Take 1 tablet by mouth every 4 (four) hours as needed for Pain.            STOP taking these medications      tamsulosin 0.4 mg Cap  Commonly known as: FLOMAX                W Gato Elkins MD  Urology  [facility]

## 2025-04-07 NOTE — OP NOTE
Date of Procedure: 04/07/2025     Preoperative Diagnosis:  Left ureteral stone    Postoperative Diagnosis:  Left ureteral stone    Primary Surgeon: SOL Elkins MD    Anesthesia:  General    Procedure Performed:  Cystoscopy, left retrograde pyelogram, left ureteroscopy, left ureteral stent placement, fluoroscopy, Jewell catheter placement    Estimated Blood Loss:  0 mL    Drains:  6 Armenian x 26 cm double-J stent with string, 16 Armenian Jewell catheter    Specimens Removed:  None    Complications:  None    Indications: Jakob Arboleda is a 19-year-old male with a history of a left-sided ureteral stone.  He was given a trial of passage.  He has continued to have pain on the left side.   He agreed to a left-sided ureteroscopy for clearance of the stone.    Procedure in Detail:    Jakob Arboleda was taken to the operating room where he was positively identified by kaylee.  He has placed supine on the operating room table.  Following induction of adequate general anesthesia he was placed in the dorsal lithotomy position and his external genitalia were prepped and draped in usual sterile fashion.      A preoperative time-out was performed as well as confirmation of preoperative antibiotics and preoperative marking on the left side.      A  film was taking using fluoroscopy.    A 22 Armenian rigid cystoscope was passed per urethra into the bladder under direct vision.  There were no urethral lesions seen once into the bladder the bladder was inspected.  There were no tumors seen lesions seen.  Both ureteral orifices were identified and they were seen to be in the orthotopic position.      I then passed a 5 Armenian open-ended catheter through the scope intubating the left ureteral orifice.  A gentle retrograde pyelogram was performed.  Contrast was seen traveling up the ureter to the level of the pelvicaliceal system.  There were no filling defects seen within the ureter or the pelvicaliceal system.  There was no  evidence of any hydronephrosis or hydroureter.      I then passed a 0.035 in Bentson wire through the open-ended catheter up to the level of the left kidney.  The scope and catheter was withdrawn leaving the wire in place.      I then advanced a rigid ureteroscope per urethra into the bladder.  I then passed a hydrophilic tip Sensor wire through the scope alongside the Bentson wire up to the level of the kidney.  I then drove the scope between the 2 wires up to the level of the mid proximal ureter.  There was no evidence of any stones present.    The scope was then carefully withdrawn inspecting the ureter no stone seen.      I then passed a 6 Zimbabwean x 26 cm double-J stent with string over the wire to plan coil within the left kidney and a coil within the bladder.    I then passed a 16 [place]an Jewell catheter for postoperative drainage.      Then string was then taped to the penis.    His anesthesia was reversed he was taken to the recovery room in stable condition.

## 2025-04-07 NOTE — ANESTHESIA POSTPROCEDURE EVALUATION
Anesthesia Post Evaluation    Patient: Jakob Arboleda    Procedure(s) Performed: Procedure(s) (LRB):  CYSTOURETEROSCOPY, RETROGRADE PYELOGRAM, URETERAL STENT PLACEMENT (Left)    Final Anesthesia Type: general      Patient location during evaluation: PACU  Patient participation: Yes- Able to Participate  Level of consciousness: awake and alert and oriented  Post-procedure vital signs: reviewed and stable  Pain management: adequate  Airway patency: patent    PONV status at discharge: No PONV  Anesthetic complications: no      Cardiovascular status: hemodynamically stable and blood pressure returned to baseline  Respiratory status: spontaneous ventilation, room air and unassisted  Hydration status: euvolemic  Follow-up not needed.              Vitals Value Taken Time   /64 04/07/25 12:08   Temp 36.3 °C (97.4 °F) 04/07/25 12:08   Pulse 56 04/07/25 12:08   Resp 16 04/07/25 12:08   SpO2 100 % 04/07/25 12:08         Event Time   Out of Recovery 12:01:39         Pain/Analia Score: Pain Rating Prior to Med Admin: 6 (4/7/2025 11:48 AM)  Pain Rating Post Med Admin: 4 (4/7/2025 12:00 PM)  Analia Score: 9 (4/7/2025 12:08 PM)

## 2025-04-07 NOTE — ANESTHESIA PROCEDURE NOTES
Intubation    Date/Time: 4/7/2025 10:41 AM    Performed by: Rebecca Hurtado CRNA  Authorized by: Mono Escobedo MD    Intubation:     Induction:  Intravenous    Intubated:  Postinduction    Mask Ventilation:  Easy mask    Attempts:  1    Attempted By:  CRNA    Method of Intubation:  Video laryngoscopy    Blade:  Mcgraw 3    Laryngeal View Grade: Grade I - full view of cords      Difficult Airway Encountered?: No      Complications:  None    Airway Device:  Oral endotracheal tube    Airway Device Size:  7.5    Style/Cuff Inflation:  Cuffed (inflated to minimal occlusive pressure)    Tube secured:  22    Secured at:  The lips    Placement Verified By:  Capnometry    Complicating Factors:  None    Findings Post-Intubation:  BS equal bilateral and atraumatic/condition of teeth unchanged

## (undated) DEVICE — SENSOR DUAL FLEX STR 150CM

## (undated) DEVICE — CANISTER SUCTION RIGID 2000CC

## (undated) DEVICE — BLANKET UPPER BODY 78.7X29.9IN

## (undated) DEVICE — FOAM APP FILM BARRIER NO STING

## (undated) DEVICE — DRESSING TRANS 2X2 TEGADERM

## (undated) DEVICE — ADAPT UROLOGICAL 2-WAY STOPCK

## (undated) DEVICE — SUPPORT ULNA NERVE PROTECTOR

## (undated) DEVICE — TRAY CATH 1-LYR URIMTR 16FR

## (undated) DEVICE — BAG PRESSURE INFUSER 3000CC

## (undated) DEVICE — WIRE GUIDE .035 150CM.

## (undated) DEVICE — CATH URTRL OPEN END STR TIP 5F

## (undated) DEVICE — CONTAINER SPECIMEN OR STER 4OZ

## (undated) DEVICE — SYR ONLY LUER LOCK 20CC

## (undated) DEVICE — COVER SNAP KAP 26IN

## (undated) DEVICE — Device

## (undated) DEVICE — GLOVE SIGNATURE ESSNTL LTX 7.5

## (undated) DEVICE — SOL NACL IRR 3000ML

## (undated) DEVICE — GOWN B1 X-LG X-LONG

## (undated) DEVICE — MAT QUICK 40X30 FLOOR FLUID LF